# Patient Record
Sex: FEMALE | Race: OTHER | NOT HISPANIC OR LATINO
[De-identification: names, ages, dates, MRNs, and addresses within clinical notes are randomized per-mention and may not be internally consistent; named-entity substitution may affect disease eponyms.]

---

## 2017-08-04 PROBLEM — Z00.00 ENCOUNTER FOR PREVENTIVE HEALTH EXAMINATION: Status: ACTIVE | Noted: 2017-08-04

## 2017-10-03 ENCOUNTER — TRANSCRIPTION ENCOUNTER (OUTPATIENT)
Age: 72
End: 2017-10-03

## 2017-10-04 ENCOUNTER — APPOINTMENT (OUTPATIENT)
Dept: ORTHOPEDIC SURGERY | Facility: CLINIC | Age: 72
End: 2017-10-04
Payer: MEDICARE

## 2017-10-04 VITALS
HEART RATE: 93 BPM | BODY MASS INDEX: 32.37 KG/M2 | HEIGHT: 63.5 IN | DIASTOLIC BLOOD PRESSURE: 83 MMHG | WEIGHT: 185 LBS | SYSTOLIC BLOOD PRESSURE: 144 MMHG

## 2017-10-04 DIAGNOSIS — Z87.39 PERSONAL HISTORY OF OTHER DISEASES OF THE MUSCULOSKELETAL SYSTEM AND CONNECTIVE TISSUE: ICD-10-CM

## 2017-10-04 DIAGNOSIS — Z80.9 FAMILY HISTORY OF MALIGNANT NEOPLASM, UNSPECIFIED: ICD-10-CM

## 2017-10-04 DIAGNOSIS — Z78.9 OTHER SPECIFIED HEALTH STATUS: ICD-10-CM

## 2017-10-04 DIAGNOSIS — M17.11 UNILATERAL PRIMARY OSTEOARTHRITIS, RIGHT KNEE: ICD-10-CM

## 2017-10-04 PROCEDURE — 99204 OFFICE O/P NEW MOD 45 MIN: CPT

## 2017-10-04 PROCEDURE — 73562 X-RAY EXAM OF KNEE 3: CPT | Mod: RT

## 2017-10-04 PROCEDURE — 73502 X-RAY EXAM HIP UNI 2-3 VIEWS: CPT | Mod: RT

## 2017-10-04 RX ORDER — ACETAMINOPHEN 325 MG/1
TABLET, FILM COATED ORAL
Refills: 0 | Status: ACTIVE | COMMUNITY

## 2017-10-18 ENCOUNTER — OUTPATIENT (OUTPATIENT)
Dept: OUTPATIENT SERVICES | Facility: HOSPITAL | Age: 72
LOS: 1 days | End: 2017-10-18
Payer: MEDICARE

## 2017-10-18 VITALS
DIASTOLIC BLOOD PRESSURE: 83 MMHG | TEMPERATURE: 97 F | SYSTOLIC BLOOD PRESSURE: 143 MMHG | HEIGHT: 63 IN | HEART RATE: 59 BPM | RESPIRATION RATE: 16 BRPM | WEIGHT: 187.39 LBS

## 2017-10-18 DIAGNOSIS — M21.619 BUNION OF UNSPECIFIED FOOT: Chronic | ICD-10-CM

## 2017-10-18 DIAGNOSIS — Z01.818 ENCOUNTER FOR OTHER PREPROCEDURAL EXAMINATION: ICD-10-CM

## 2017-10-18 DIAGNOSIS — M25.9 JOINT DISORDER, UNSPECIFIED: Chronic | ICD-10-CM

## 2017-10-18 DIAGNOSIS — M25.569 PAIN IN UNSPECIFIED KNEE: ICD-10-CM

## 2017-10-18 DIAGNOSIS — M85.619 OTHER CYST OF BONE, UNSPECIFIED SHOULDER: Chronic | ICD-10-CM

## 2017-10-18 LAB
ALBUMIN SERPL ELPH-MCNC: 4.2 G/DL — SIGNIFICANT CHANGE UP (ref 3.3–5.2)
ALP SERPL-CCNC: 83 U/L — SIGNIFICANT CHANGE UP (ref 40–120)
ALT FLD-CCNC: 15 U/L — SIGNIFICANT CHANGE UP
ANION GAP SERPL CALC-SCNC: 12 MMOL/L — SIGNIFICANT CHANGE UP (ref 5–17)
APTT BLD: 33.2 SEC — SIGNIFICANT CHANGE UP (ref 27.5–37.4)
AST SERPL-CCNC: 16 U/L — SIGNIFICANT CHANGE UP
BASOPHILS # BLD AUTO: 0 K/UL — SIGNIFICANT CHANGE UP (ref 0–0.2)
BASOPHILS NFR BLD AUTO: 0.4 % — SIGNIFICANT CHANGE UP (ref 0–2)
BILIRUB SERPL-MCNC: 0.5 MG/DL — SIGNIFICANT CHANGE UP (ref 0.4–2)
BLD GP AB SCN SERPL QL: SIGNIFICANT CHANGE UP
BUN SERPL-MCNC: 14 MG/DL — SIGNIFICANT CHANGE UP (ref 8–20)
CALCIUM SERPL-MCNC: 9.7 MG/DL — SIGNIFICANT CHANGE UP (ref 8.6–10.2)
CHLORIDE SERPL-SCNC: 102 MMOL/L — SIGNIFICANT CHANGE UP (ref 98–107)
CO2 SERPL-SCNC: 29 MMOL/L — SIGNIFICANT CHANGE UP (ref 22–29)
CREAT SERPL-MCNC: 0.5 MG/DL — SIGNIFICANT CHANGE UP (ref 0.5–1.3)
EOSINOPHIL # BLD AUTO: 0.3 K/UL — SIGNIFICANT CHANGE UP (ref 0–0.5)
EOSINOPHIL NFR BLD AUTO: 4.9 % — SIGNIFICANT CHANGE UP (ref 0–6)
GLUCOSE SERPL-MCNC: 85 MG/DL — SIGNIFICANT CHANGE UP (ref 70–115)
HCT VFR BLD CALC: 44.7 % — SIGNIFICANT CHANGE UP (ref 37–47)
HGB BLD-MCNC: 14 G/DL — SIGNIFICANT CHANGE UP (ref 12–16)
INR BLD: 0.98 RATIO — SIGNIFICANT CHANGE UP (ref 0.88–1.16)
LYMPHOCYTES # BLD AUTO: 1.6 K/UL — SIGNIFICANT CHANGE UP (ref 1–4.8)
LYMPHOCYTES # BLD AUTO: 23.7 % — SIGNIFICANT CHANGE UP (ref 20–55)
MCHC RBC-ENTMCNC: 28.6 PG — SIGNIFICANT CHANGE UP (ref 27–31)
MCHC RBC-ENTMCNC: 31.3 G/DL — LOW (ref 32–36)
MCV RBC AUTO: 91.2 FL — SIGNIFICANT CHANGE UP (ref 81–99)
MONOCYTES # BLD AUTO: 0.7 K/UL — SIGNIFICANT CHANGE UP (ref 0–0.8)
MONOCYTES NFR BLD AUTO: 9.9 % — SIGNIFICANT CHANGE UP (ref 3–10)
MRSA PCR RESULT.: SIGNIFICANT CHANGE UP
NEUTROPHILS # BLD AUTO: 4.2 K/UL — SIGNIFICANT CHANGE UP (ref 1.8–8)
NEUTROPHILS NFR BLD AUTO: 60.8 % — SIGNIFICANT CHANGE UP (ref 37–73)
PLATELET # BLD AUTO: 230 K/UL — SIGNIFICANT CHANGE UP (ref 150–400)
POTASSIUM SERPL-MCNC: 4.4 MMOL/L — SIGNIFICANT CHANGE UP (ref 3.5–5.3)
POTASSIUM SERPL-SCNC: 4.4 MMOL/L — SIGNIFICANT CHANGE UP (ref 3.5–5.3)
PROT SERPL-MCNC: 7.1 G/DL — SIGNIFICANT CHANGE UP (ref 6.6–8.7)
PROTHROM AB SERPL-ACNC: 10.8 SEC — SIGNIFICANT CHANGE UP (ref 9.8–12.7)
RBC # BLD: 4.9 M/UL — SIGNIFICANT CHANGE UP (ref 4.4–5.2)
RBC # FLD: 14.9 % — SIGNIFICANT CHANGE UP (ref 11–15.6)
S AUREUS DNA NOSE QL NAA+PROBE: SIGNIFICANT CHANGE UP
SODIUM SERPL-SCNC: 143 MMOL/L — SIGNIFICANT CHANGE UP (ref 135–145)
TYPE + AB SCN PNL BLD: SIGNIFICANT CHANGE UP
WBC # BLD: 7 K/UL — SIGNIFICANT CHANGE UP (ref 4.8–10.8)
WBC # FLD AUTO: 7 K/UL — SIGNIFICANT CHANGE UP (ref 4.8–10.8)

## 2017-10-18 PROCEDURE — 85610 PROTHROMBIN TIME: CPT

## 2017-10-18 PROCEDURE — 93005 ELECTROCARDIOGRAM TRACING: CPT

## 2017-10-18 PROCEDURE — 93010 ELECTROCARDIOGRAM REPORT: CPT

## 2017-10-18 PROCEDURE — 85027 COMPLETE CBC AUTOMATED: CPT

## 2017-10-18 PROCEDURE — G0463: CPT

## 2017-10-18 PROCEDURE — 86850 RBC ANTIBODY SCREEN: CPT

## 2017-10-18 PROCEDURE — 87640 STAPH A DNA AMP PROBE: CPT

## 2017-10-18 PROCEDURE — 85730 THROMBOPLASTIN TIME PARTIAL: CPT

## 2017-10-18 PROCEDURE — 36415 COLL VENOUS BLD VENIPUNCTURE: CPT

## 2017-10-18 PROCEDURE — 80053 COMPREHEN METABOLIC PANEL: CPT

## 2017-10-18 PROCEDURE — 87641 MR-STAPH DNA AMP PROBE: CPT

## 2017-10-18 PROCEDURE — 86900 BLOOD TYPING SEROLOGIC ABO: CPT

## 2017-10-18 PROCEDURE — 86901 BLOOD TYPING SEROLOGIC RH(D): CPT

## 2017-10-18 RX ORDER — SODIUM CHLORIDE 9 MG/ML
3 INJECTION INTRAMUSCULAR; INTRAVENOUS; SUBCUTANEOUS EVERY 8 HOURS
Qty: 0 | Refills: 0 | Status: DISCONTINUED | OUTPATIENT
Start: 2017-11-09 | End: 2017-11-12

## 2017-10-18 NOTE — H&P PST ADULT - PSH
Bunion  great toe bilaterally , left foot small toe  Cyst of clavicle  benign  Disorder of wrist joint  left wrist tendon  2017

## 2017-10-18 NOTE — H&P PST ADULT - HISTORY OF PRESENT ILLNESS
72 year old female presents with a several year history of bilateral knee pain, right is worse than left. 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 pt has pain with stairs and walking. Pt fell in July walking down stairs . Ambulates without assistance, takes tylenol for pain.

## 2017-10-18 NOTE — H&P PST ADULT - ASSESSMENT
Pleasant 72 year old female with history of knee pain and arthritis presents for right knee replacement.

## 2017-10-18 NOTE — H&P PST ADULT - MUSCULOSKELETAL COMMENTS
back pain , l2 compression fracture physical therapy  weekly (fell July 2017). bilateral knee pain right knee cracking with extension.

## 2017-10-18 NOTE — H&P PST ADULT - FAMILY HISTORY
Father  Still living? No  Family history of cancer, Age at diagnosis: Age Unknown     Mother  Still living? Yes, Estimated age: 81-90  Family history of Alzheimer's disease, Age at diagnosis: Age Unknown

## 2017-11-07 ENCOUNTER — CHART COPY (OUTPATIENT)
Age: 72
End: 2017-11-07

## 2017-11-07 RX ORDER — CELECOXIB 200 MG/1
400 CAPSULE ORAL ONCE
Qty: 0 | Refills: 0 | Status: COMPLETED | OUTPATIENT
Start: 2017-11-09 | End: 2017-11-09

## 2017-11-07 RX ORDER — GABAPENTIN 400 MG/1
600 CAPSULE ORAL ONCE
Qty: 0 | Refills: 0 | Status: COMPLETED | OUTPATIENT
Start: 2017-11-09 | End: 2017-11-09

## 2017-11-07 RX ORDER — OXYCODONE HYDROCHLORIDE 5 MG/1
10 TABLET ORAL ONCE
Qty: 0 | Refills: 0 | Status: DISCONTINUED | OUTPATIENT
Start: 2017-11-09 | End: 2017-11-09

## 2017-11-08 ENCOUNTER — FORM ENCOUNTER (OUTPATIENT)
Age: 72
End: 2017-11-08

## 2017-11-09 ENCOUNTER — APPOINTMENT (OUTPATIENT)
Dept: ORTHOPEDIC SURGERY | Facility: HOSPITAL | Age: 72
End: 2017-11-09

## 2017-11-09 ENCOUNTER — RESULT REVIEW (OUTPATIENT)
Age: 72
End: 2017-11-09

## 2017-11-09 ENCOUNTER — INPATIENT (INPATIENT)
Facility: HOSPITAL | Age: 72
LOS: 2 days | Discharge: HOME | DRG: 470 | End: 2017-11-12
Attending: ORTHOPAEDIC SURGERY | Admitting: ORTHOPAEDIC SURGERY
Payer: MEDICARE

## 2017-11-09 ENCOUNTER — TRANSCRIPTION ENCOUNTER (OUTPATIENT)
Age: 72
End: 2017-11-09

## 2017-11-09 VITALS
HEART RATE: 75 BPM | DIASTOLIC BLOOD PRESSURE: 71 MMHG | WEIGHT: 187.39 LBS | RESPIRATION RATE: 16 BRPM | TEMPERATURE: 98 F | HEIGHT: 63 IN | SYSTOLIC BLOOD PRESSURE: 132 MMHG | OXYGEN SATURATION: 100 %

## 2017-11-09 DIAGNOSIS — M85.619 OTHER CYST OF BONE, UNSPECIFIED SHOULDER: Chronic | ICD-10-CM

## 2017-11-09 DIAGNOSIS — Z96.651 PRESENCE OF RIGHT ARTIFICIAL KNEE JOINT: ICD-10-CM

## 2017-11-09 DIAGNOSIS — Z29.9 ENCOUNTER FOR PROPHYLACTIC MEASURES, UNSPECIFIED: ICD-10-CM

## 2017-11-09 DIAGNOSIS — S32.020A WEDGE COMPRESSION FRACTURE OF SECOND LUMBAR VERTEBRA, INITIAL ENCOUNTER FOR CLOSED FRACTURE: ICD-10-CM

## 2017-11-09 DIAGNOSIS — M25.9 JOINT DISORDER, UNSPECIFIED: Chronic | ICD-10-CM

## 2017-11-09 DIAGNOSIS — M17.11 UNILATERAL PRIMARY OSTEOARTHRITIS, RIGHT KNEE: ICD-10-CM

## 2017-11-09 DIAGNOSIS — M21.619 BUNION OF UNSPECIFIED FOOT: Chronic | ICD-10-CM

## 2017-11-09 LAB — BLD GP AB SCN SERPL QL: SIGNIFICANT CHANGE UP

## 2017-11-09 PROCEDURE — 88305 TISSUE EXAM BY PATHOLOGIST: CPT | Mod: 26

## 2017-11-09 PROCEDURE — 99232 SBSQ HOSP IP/OBS MODERATE 35: CPT

## 2017-11-09 PROCEDURE — 99222 1ST HOSP IP/OBS MODERATE 55: CPT

## 2017-11-09 PROCEDURE — 27447 TOTAL KNEE ARTHROPLASTY: CPT | Mod: RT

## 2017-11-09 PROCEDURE — 27447 TOTAL KNEE ARTHROPLASTY: CPT | Mod: AS,RT

## 2017-11-09 PROCEDURE — 88311 DECALCIFY TISSUE: CPT | Mod: 26

## 2017-11-09 PROCEDURE — 20985 CPTR-ASST DIR MS PX: CPT | Mod: AS

## 2017-11-09 PROCEDURE — 73560 X-RAY EXAM OF KNEE 1 OR 2: CPT | Mod: 26,RT

## 2017-11-09 PROCEDURE — 20985 CPTR-ASST DIR MS PX: CPT

## 2017-11-09 RX ORDER — DOCUSATE SODIUM 100 MG
100 CAPSULE ORAL THREE TIMES A DAY
Qty: 0 | Refills: 0 | Status: DISCONTINUED | OUTPATIENT
Start: 2017-11-09 | End: 2017-11-12

## 2017-11-09 RX ORDER — KETOROLAC TROMETHAMINE 30 MG/ML
15 SYRINGE (ML) INJECTION EVERY 6 HOURS
Qty: 0 | Refills: 0 | Status: DISCONTINUED | OUTPATIENT
Start: 2017-11-09 | End: 2017-11-11

## 2017-11-09 RX ORDER — ASPIRIN/CALCIUM CARB/MAGNESIUM 324 MG
325 TABLET ORAL
Qty: 0 | Refills: 0 | Status: DISCONTINUED | OUTPATIENT
Start: 2017-11-10 | End: 2017-11-12

## 2017-11-09 RX ORDER — HYDROMORPHONE HYDROCHLORIDE 2 MG/ML
0.5 INJECTION INTRAMUSCULAR; INTRAVENOUS; SUBCUTANEOUS
Qty: 0 | Refills: 0 | Status: DISCONTINUED | OUTPATIENT
Start: 2017-11-09 | End: 2017-11-09

## 2017-11-09 RX ORDER — VANCOMYCIN HCL 1 G
1250 VIAL (EA) INTRAVENOUS
Qty: 0 | Refills: 0 | Status: COMPLETED | OUTPATIENT
Start: 2017-11-09 | End: 2017-11-09

## 2017-11-09 RX ORDER — HYDROMORPHONE HYDROCHLORIDE 2 MG/ML
2 INJECTION INTRAMUSCULAR; INTRAVENOUS; SUBCUTANEOUS
Qty: 0 | Refills: 0 | Status: DISCONTINUED | OUTPATIENT
Start: 2017-11-09 | End: 2017-11-12

## 2017-11-09 RX ORDER — SODIUM CHLORIDE 9 MG/ML
1000 INJECTION, SOLUTION INTRAVENOUS
Qty: 0 | Refills: 0 | Status: DISCONTINUED | OUTPATIENT
Start: 2017-11-09 | End: 2017-11-10

## 2017-11-09 RX ORDER — TRANEXAMIC ACID 100 MG/ML
850 INJECTION, SOLUTION INTRAVENOUS ONCE
Qty: 0 | Refills: 0 | Status: COMPLETED | OUTPATIENT
Start: 2017-11-09 | End: 2017-11-09

## 2017-11-09 RX ORDER — VANCOMYCIN HCL 1 G
1250 VIAL (EA) INTRAVENOUS ONCE
Qty: 0 | Refills: 0 | Status: COMPLETED | OUTPATIENT
Start: 2017-11-09 | End: 2017-11-09

## 2017-11-09 RX ORDER — MORPHINE SULFATE 50 MG/1
4 CAPSULE, EXTENDED RELEASE ORAL
Qty: 0 | Refills: 0 | Status: DISCONTINUED | OUTPATIENT
Start: 2017-11-09 | End: 2017-11-09

## 2017-11-09 RX ORDER — ACETAMINOPHEN 500 MG
2 TABLET ORAL
Qty: 0 | Refills: 0 | COMMUNITY

## 2017-11-09 RX ORDER — POLYETHYLENE GLYCOL 3350 17 G/17G
17 POWDER, FOR SOLUTION ORAL DAILY
Qty: 0 | Refills: 0 | Status: DISCONTINUED | OUTPATIENT
Start: 2017-11-09 | End: 2017-11-12

## 2017-11-09 RX ORDER — CELECOXIB 200 MG/1
200 CAPSULE ORAL
Qty: 0 | Refills: 0 | Status: DISCONTINUED | OUTPATIENT
Start: 2017-11-10 | End: 2017-11-12

## 2017-11-09 RX ORDER — OXYCODONE HYDROCHLORIDE 5 MG/1
5 TABLET ORAL EVERY 4 HOURS
Qty: 0 | Refills: 0 | Status: DISCONTINUED | OUTPATIENT
Start: 2017-11-09 | End: 2017-11-12

## 2017-11-09 RX ORDER — OXYCODONE HYDROCHLORIDE 5 MG/1
10 TABLET ORAL EVERY 4 HOURS
Qty: 0 | Refills: 0 | Status: DISCONTINUED | OUTPATIENT
Start: 2017-11-09 | End: 2017-11-12

## 2017-11-09 RX ORDER — ACETAMINOPHEN 500 MG
650 TABLET ORAL EVERY 6 HOURS
Qty: 0 | Refills: 0 | Status: DISCONTINUED | OUTPATIENT
Start: 2017-11-09 | End: 2017-11-12

## 2017-11-09 RX ORDER — ACETAMINOPHEN 500 MG
650 TABLET ORAL EVERY 6 HOURS
Qty: 0 | Refills: 0 | Status: COMPLETED | OUTPATIENT
Start: 2017-11-09 | End: 2017-11-12

## 2017-11-09 RX ORDER — ONDANSETRON 8 MG/1
4 TABLET, FILM COATED ORAL EVERY 6 HOURS
Qty: 0 | Refills: 0 | Status: DISCONTINUED | OUTPATIENT
Start: 2017-11-09 | End: 2017-11-12

## 2017-11-09 RX ORDER — OXYCODONE HYDROCHLORIDE 5 MG/1
10 TABLET ORAL EVERY 12 HOURS
Qty: 0 | Refills: 0 | Status: DISCONTINUED | OUTPATIENT
Start: 2017-11-09 | End: 2017-11-12

## 2017-11-09 RX ORDER — HYDROMORPHONE HYDROCHLORIDE 2 MG/ML
0.5 INJECTION INTRAMUSCULAR; INTRAVENOUS; SUBCUTANEOUS EVERY 4 HOURS
Qty: 0 | Refills: 0 | Status: DISCONTINUED | OUTPATIENT
Start: 2017-11-09 | End: 2017-11-12

## 2017-11-09 RX ORDER — CEFAZOLIN SODIUM 1 G
2000 VIAL (EA) INJECTION
Qty: 0 | Refills: 0 | Status: COMPLETED | OUTPATIENT
Start: 2017-11-09 | End: 2017-11-10

## 2017-11-09 RX ORDER — SENNA PLUS 8.6 MG/1
2 TABLET ORAL AT BEDTIME
Qty: 0 | Refills: 0 | Status: DISCONTINUED | OUTPATIENT
Start: 2017-11-09 | End: 2017-11-12

## 2017-11-09 RX ORDER — ACETAMINOPHEN 500 MG
1000 TABLET ORAL ONCE
Qty: 0 | Refills: 0 | Status: COMPLETED | OUTPATIENT
Start: 2017-11-09 | End: 2017-11-09

## 2017-11-09 RX ORDER — MAGNESIUM HYDROXIDE 400 MG/1
30 TABLET, CHEWABLE ORAL DAILY
Qty: 0 | Refills: 0 | Status: DISCONTINUED | OUTPATIENT
Start: 2017-11-09 | End: 2017-11-12

## 2017-11-09 RX ORDER — ONDANSETRON 8 MG/1
4 TABLET, FILM COATED ORAL ONCE
Qty: 0 | Refills: 0 | Status: DISCONTINUED | OUTPATIENT
Start: 2017-11-09 | End: 2017-11-09

## 2017-11-09 RX ORDER — CEFAZOLIN SODIUM 1 G
2000 VIAL (EA) INJECTION ONCE
Qty: 0 | Refills: 0 | Status: COMPLETED | OUTPATIENT
Start: 2017-11-09 | End: 2017-11-09

## 2017-11-09 RX ADMIN — Medication 100 MILLIGRAM(S): at 11:00

## 2017-11-09 RX ADMIN — OXYCODONE HYDROCHLORIDE 10 MILLIGRAM(S): 5 TABLET ORAL at 10:43

## 2017-11-09 RX ADMIN — Medication 166.67 MILLIGRAM(S): at 11:00

## 2017-11-09 RX ADMIN — TRANEXAMIC ACID 217 MILLIGRAM(S): 100 INJECTION, SOLUTION INTRAVENOUS at 11:00

## 2017-11-09 RX ADMIN — Medication 166.67 MILLIGRAM(S): at 23:38

## 2017-11-09 RX ADMIN — CELECOXIB 400 MILLIGRAM(S): 200 CAPSULE ORAL at 10:42

## 2017-11-09 RX ADMIN — HYDROMORPHONE HYDROCHLORIDE 2 MILLIGRAM(S): 2 INJECTION INTRAMUSCULAR; INTRAVENOUS; SUBCUTANEOUS at 23:51

## 2017-11-09 RX ADMIN — Medication 100 MILLIGRAM(S): at 18:09

## 2017-11-09 RX ADMIN — ONDANSETRON 4 MILLIGRAM(S): 8 TABLET, FILM COATED ORAL at 19:55

## 2017-11-09 RX ADMIN — SODIUM CHLORIDE 3 MILLILITER(S): 9 INJECTION INTRAMUSCULAR; INTRAVENOUS; SUBCUTANEOUS at 21:54

## 2017-11-09 RX ADMIN — Medication 650 MILLIGRAM(S): at 18:10

## 2017-11-09 RX ADMIN — Medication 650 MILLIGRAM(S): at 19:30

## 2017-11-09 RX ADMIN — SODIUM CHLORIDE 3 MILLILITER(S): 9 INJECTION INTRAMUSCULAR; INTRAVENOUS; SUBCUTANEOUS at 13:54

## 2017-11-09 RX ADMIN — Medication 400 MILLIGRAM(S): at 13:53

## 2017-11-09 RX ADMIN — GABAPENTIN 600 MILLIGRAM(S): 400 CAPSULE ORAL at 10:42

## 2017-11-09 RX ADMIN — Medication 100 MILLIGRAM(S): at 23:38

## 2017-11-09 RX ADMIN — CELECOXIB 400 MILLIGRAM(S): 200 CAPSULE ORAL at 10:43

## 2017-11-09 RX ADMIN — Medication 650 MILLIGRAM(S): at 23:38

## 2017-11-09 RX ADMIN — OXYCODONE HYDROCHLORIDE 10 MILLIGRAM(S): 5 TABLET ORAL at 10:42

## 2017-11-09 NOTE — PHYSICAL THERAPY INITIAL EVALUATION ADULT - RANGE OF MOTION EXAMINATION, REHAB EVAL
bilateral upper extremity ROM was WFL (within functional limits)/Left LE ROM was WFL (within functional limits)/right knee lacking approx 5-10 degrees extension (in sitting) to approx 60-65 degrees flexion (in sitting)

## 2017-11-09 NOTE — DISCHARGE NOTE ADULT - CARE PLAN
Principal Discharge DX:	Primary osteoarthritis of right knee  Goal:	Decrease Pain, Improve Ambulation and Activities of Daily living  Instructions for follow-up, activity and diet:	The patient will be seen in the office between 2-3 weeks for wound check. Tape will be removed at that time. Patient may shower after post-op day #5. The dressing is to be removed on day #10 (11/20/2017).  The patient will contact the office if the wound becomes red, has increasing pain, develops bleeding or discharge, an injury occurs, or has other concerns. The patient will continue PT consistent with total knee replacement. The patient will continue ASPIRIN 325mg twice daily for 6 weeks for DVTP. The patient will take OXYCODONE and TYLENOL for pain control and titrate according to prescription and patient needs. The patient is FULL weight bearing. Elevation of the lower leg is recommended to reduce swelling. Principal Discharge DX:	Primary osteoarthritis of right knee  Goal:	Decrease Pain, Improve Ambulation and Activities of Daily living  Instructions for follow-up, activity and diet:	The patient will be seen in the office between 2-3 weeks for wound check. Tape will be removed at that time. Patient may shower after post-op day #5, 11/14. The dressing is to be removed on day #10 (11/20/2017).  The patient will contact the office if the wound becomes red, has increasing pain, develops bleeding or discharge, an injury occurs, or has other concerns. The patient will continue PT consistent with total knee replacement. The patient will continue ASPIRIN 325mg twice daily for 6 weeks for DVTP. The patient will take OXYCODONE and TYLENOL for pain control and titrate according to prescription and patient needs. The patient is FULL weight bearing. Elevation of the lower leg is recommended to reduce swelling.

## 2017-11-09 NOTE — PHYSICAL THERAPY INITIAL EVALUATION ADULT - CRITERIA FOR SKILLED THERAPEUTIC INTERVENTIONS
anticipated equipment needs at discharge/anticipated discharge recommendation/Home with home PT, RW/impairments found

## 2017-11-09 NOTE — CONSULT NOTE ADULT - ASSESSMENT
72 year old female presents with a several year history of bilateral knee pain, right is worse than left. 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 Pain increased  with stairs and walking. Pt fell in July walking down stairs . Ambulates without assistance, takes Tylenol for pain.  Now s/p R TKA , POD # 0

## 2017-11-09 NOTE — DISCHARGE NOTE ADULT - MEDICATION SUMMARY - MEDICATIONS TO TAKE
I will START or STAY ON the medications listed below when I get home from the hospital:    Tylenol 325 mg oral tablet  -- 2 tab(s) by mouth every 4 hours, As Needed  -- Indication: For home med/pain    oxyCODONE 10 mg oral tablet  -- 0.5-1 tab(s) by mouth every 4 to 6 hours, As Needed MDD:6  -- Indication: For Pain    aspirin 325 mg oral delayed release tablet  -- 1 tab(s) by mouth 2 times a day  -- Indication: For DVTP    Senna S 50 mg-8.6 mg oral tablet  -- 2 tab(s) by mouth once a day (at bedtime), As Needed   -- Medication should be taken with plenty of water.    -- Indication: For Constipation

## 2017-11-09 NOTE — PHYSICAL THERAPY INITIAL EVALUATION ADULT - GAIT DEVIATIONS NOTED, PT EVAL
decreased monse/decreased weight-shifting ability/increased time in double stance/decreased step length/decreased stride length

## 2017-11-09 NOTE — DISCHARGE NOTE ADULT - PLAN OF CARE
Decrease Pain, Improve Ambulation and Activities of Daily living The patient will be seen in the office between 2-3 weeks for wound check. Tape will be removed at that time. Patient may shower after post-op day #5. The dressing is to be removed on day #10 (11/20/2017).  The patient will contact the office if the wound becomes red, has increasing pain, develops bleeding or discharge, an injury occurs, or has other concerns. The patient will continue PT consistent with total knee replacement. The patient will continue ASPIRIN 325mg twice daily for 6 weeks for DVTP. The patient will take OXYCODONE and TYLENOL for pain control and titrate according to prescription and patient needs. The patient is FULL weight bearing. Elevation of the lower leg is recommended to reduce swelling. The patient will be seen in the office between 2-3 weeks for wound check. Tape will be removed at that time. Patient may shower after post-op day #5, 11/14. The dressing is to be removed on day #10 (11/20/2017).  The patient will contact the office if the wound becomes red, has increasing pain, develops bleeding or discharge, an injury occurs, or has other concerns. The patient will continue PT consistent with total knee replacement. The patient will continue ASPIRIN 325mg twice daily for 6 weeks for DVTP. The patient will take OXYCODONE and TYLENOL for pain control and titrate according to prescription and patient needs. The patient is FULL weight bearing. Elevation of the lower leg is recommended to reduce swelling.

## 2017-11-09 NOTE — DISCHARGE NOTE ADULT - HOSPITAL COURSE
The patient underwent a RIGHT TOTAL KNEE REPLACEMENT on 11/9/2017. The patient received antibiotics consistent with SCIP guidelines. The patient underwent the procedure and had no intra-operative complications. Post-operatively, the patient was seen by medicine and PT. The patient received ASPIRIN for DVTP. The patient received pain medications per orthopedic pain management protocol and the pain was appropriately controlled. The patient did not have any post-operative medical complications. The patient was discharged in stable condition.

## 2017-11-09 NOTE — CONSULT NOTE ADULT - SUBJECTIVE AND OBJECTIVE BOX
PMD : Sarmad   Cardio :     Patient is a 72y old  Female seen and examined on PACU , S/P R TKA , POD # 0    HPI: 72 year old female presents with a several year history of bilateral knee pain, right is worse than left. 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 Pain increased  with stairs and walking. Pt fell in July walking down stairs . Ambulates without assistance, takes Tylenol for pain.  Now s/p R TKA , POD # 0    PAST MEDICAL & SURGICAL HISTORY:  Knee pain  Compression fracture of L2  Arthritis  Cyst of clavicle: benign  Disorder of wrist joint: left wrist tendon  2017  Bunion: great toe bilaterally , left foot small toe       Past Surgical History:  Bunion  great toe bilaterally , left foot small toe  Cyst of clavicle  benign  Disorder of wrist joint  left wrist tendon  2017.     Family History:  Father  Still living? No  Family history of cancer, Age at diagnosis: Age Unknown     Mother  Still living? Yes, Estimated age: 81-90  Family history of Alzheimer's disease, Age at diagnosis: Age Unknown.     Social History:  · Marital Status	  · Occupation	teaches dance  · Lives With	children; spouse; daughter     Substance Use History:  · Substance Use	caffeine  denies drug use  · Caffeine Type	coffee  · Caffeine Amount/Frequency	1-2 cups/cans per day     Alcohol Use History:  · Have you ever consumed alcohol	yes...  · Alcohol Type	wine  · Alcohol Frequency	monthly or less  · Alcohol Amount	1-2 drinks  · Problems Related to Alcohol Use	no  · 1. Have you felt you ought to CUT down on your drinking?	no  · 2. Have people ANNOYED you by criticizing your drinking?	no  · 3. Have you ever felt bad or GUILTY about your drinking?	no  · 4. Have you ever needed an "EYE OPENER", a drink first thing in the morning to steady your nerves or get rid of a hangover?	no     Tobacco Usage:  · Tobacco Usage: Never smoker      FAMILY HISTORY:  Family history of Alzheimer's disease (Mother)  Family history of cancer (Father)      Allergies    No Known Allergies    Intolerances        HOME MEDICATIONS :     REVIEW OF SYSTEMS:    CONSTITUTIONAL: No fever, weight loss, or fatigue  EYES: No eye pain, visual disturbances, or discharge  NECK: No pain or stiffness  RESPIRATORY: No cough, wheezing, chills or hemoptysis; No shortness of breath  CARDIOVASCULAR: No chest pain, palpitations, dizziness, or leg swelling  GASTROINTESTINAL: No abdominal or epigastric pain. No nausea, vomiting, or hematemesis; No diarrhea or constipation. No melena or hematochezia.  GENITOURINARY: No dysuria, frequency, hematuria, or incontinence  NEUROLOGICAL: No headaches, memory loss, loss of strength, numbness, or tremors  SKIN: No itching, burning, rashes, or lesions   LYMPH NODES: No enlarged glands  ENDOCRINE: No heat or cold intolerance; No hair loss  MUSCULOSKELETAL:   PSYCHIATRIC: No depression, anxiety, mood swings, or difficulty sleeping  HEME/LYMPH: No easy bruising, or bleeding gums  ALLERGY AND IMMUNOLOGIC: No hives or eczema    MEDICATIONS  (STANDING):  sodium chloride 0.9% lock flush 3 milliLiter(s) IV Push every 8 hours    MEDICATIONS  (PRN):  HYDROmorphone  Injectable 0.5 milliGRAM(s) IV Push every 10 minutes PRN Severe Pain (7 - 10)  morphine  - Injectable 4 milliGRAM(s) IV Push every 15 minutes PRN Moderate Pain (4 - 6)  ondansetron Injectable 4 milliGRAM(s) IV Push once PRN Nausea and/or Vomiting  promethazine IVPB 6.25 milliGRAM(s) IV Intermittent once PRN Nausea and/or Vomiting      Vital Signs Last 24 Hrs  T(C): 36.2 (09 Nov 2017 13:17), Max: 36.7 (09 Nov 2017 09:25)  T(F): 97.2 (09 Nov 2017 13:17), Max: 98.1 (09 Nov 2017 09:25)  HR: 81 (09 Nov 2017 13:32) (75 - 83)  BP: 107/64 (09 Nov 2017 13:32) (98/57 - 132/71)  BP(mean): --  RR: 14 (09 Nov 2017 13:32) (13 - 16)  SpO2: 98% (09 Nov 2017 13:32) (98% - 100%)    PHYSICAL EXAM:    GENERAL: NAD, well-groomed, well-developed  HEAD:  Atraumatic, Normocephalic  EYES: EOMI, PERRLA, conjunctiva and sclera clear  NECK: Supple, No JVD, Normal thyroid  NERVOUS SYSTEM:  Alert & Oriented X3, Good concentration; Motor Strength 5/5 B/L upper and lower extremities; DTRs 2+ intact and symmetric  CHEST/LUNG: CTA  b/l,  no rales, rhonchi, wheezing, or rubs  HEART: Regular rate and rhythm; No murmurs, rubs, or gallops  ABDOMEN: Soft, Nontender, Nondistended; Bowel sounds present  EXTREMITIES:  2+ Peripheral Pulses, No clubbing, cyanosis, or edema ,   LYMPH: No lymphadenopathy noted  SKIN: No rashes or lesions    LABS:                  RADIOLOGY & ADDITIONAL STUDIES: PMD : Sarmad     Patient is a 72y old  Female seen and examined on PACU , S/P R TKA , POD # 0    HPI: 72 year old female with PMH recent L2 compression fracture and OA presents with a several year history of bilateral knee pain, right is worse than left. 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 Pain increased  with stairs and walking. Pt fell in July walking down stairs . Ambulates without assistance, takes Tylenol for pain.  Now s/p R TKA , POD # 0    PAST MEDICAL & SURGICAL HISTORY:  Knee pain  Compression fracture of L2  Arthritis  Cyst of clavicle: benign  Disorder of wrist joint: left wrist tendon  2017  Bunion: great toe bilaterally , left foot small toe       Past Surgical History:  Bunion  great toe bilaterally , left foot small toe  Cyst of clavicle  benign  Disorder of wrist joint  left wrist tendon  2017.     Family History:  Father  Still living? No  Family history of cancer, Age at diagnosis: Age Unknown     Mother  Still living? Yes, Estimated age: 81-90  Family history of Alzheimer's disease, Age at diagnosis: Age Unknown.     Social History:  · Marital Status	  · Occupation	teaches dance  · Lives With	children; spouse; daughter     Substance Use History:  · Substance Use	caffeine  denies drug use  · Caffeine Type	coffee  · Caffeine Amount/Frequency	1-2 cups/cans per day     Alcohol Use History:  · Have you ever consumed alcohol	yes...  · Alcohol Type	wine  · Alcohol Frequency	monthly or less  · Alcohol Amount	1-2 drinks  · Problems Related to Alcohol Use	no  · 1. Have you felt you ought to CUT down on your drinking?	no  · 2. Have people ANNOYED you by criticizing your drinking?	no  · 3. Have you ever felt bad or GUILTY about your drinking?	no  · 4. Have you ever needed an "EYE OPENER", a drink first thing in the morning to steady your nerves or get rid of a hangover?	no     Tobacco Usage:  · Tobacco Usage: Never smoker      FAMILY HISTORY:  Family history of Alzheimer's disease (Mother)  Family history of cancer (Father)      Allergies    No Known Allergies    Intolerances        HOME MEDICATIONS :   Tylenol prn  Advil prn    REVIEW OF SYSTEMS:    CONSTITUTIONAL: No fever, weight loss, or fatigue  EYES: No eye pain, visual disturbances, or discharge  NECK: No pain or stiffness  RESPIRATORY: No cough, wheezing, or chills; No shortness of breath  CARDIOVASCULAR: No chest pain, palpitations, dizziness, or leg swelling  GASTROINTESTINAL: No abdominal or epigastric pain. No nausea or vomiting; No diarrhea or constipation.   GENITOURINARY: No dysuria, frequency, hematuria, or incontinence  NEUROLOGICAL: (+) occasional headaches, (+) numbness in LE secondary to anesthesia  SKIN: No itching, burning, rashes, or lesions   MUSCULOSKELETAL: Right knee pain  PSYCHIATRIC: No depression, anxiety, mood swings, or difficulty sleeping    MEDICATIONS  (STANDING):  sodium chloride 0.9% lock flush 3 milliLiter(s) IV Push every 8 hours    MEDICATIONS  (PRN):  HYDROmorphone  Injectable 0.5 milliGRAM(s) IV Push every 10 minutes PRN Severe Pain (7 - 10)  morphine  - Injectable 4 milliGRAM(s) IV Push every 15 minutes PRN Moderate Pain (4 - 6)  ondansetron Injectable 4 milliGRAM(s) IV Push once PRN Nausea and/or Vomiting  promethazine IVPB 6.25 milliGRAM(s) IV Intermittent once PRN Nausea and/or Vomiting      Vital Signs Last 24 Hrs  T(C): 36.2 (09 Nov 2017 13:17), Max: 36.7 (09 Nov 2017 09:25)  T(F): 97.2 (09 Nov 2017 13:17), Max: 98.1 (09 Nov 2017 09:25)  HR: 81 (09 Nov 2017 13:32) (75 - 83)  BP: 107/64 (09 Nov 2017 13:32) (98/57 - 132/71)  BP(mean): --  RR: 14 (09 Nov 2017 13:32) (13 - 16)  SpO2: 98% (09 Nov 2017 13:32) (98% - 100%)    PHYSICAL EXAM:    GENERAL: NAD, well-groomed, well-developed  HEAD:  Atraumatic, Normocephalic  EYES: EOMI, PERRLA, conjunctiva and sclera clear  NECK: Supple, No JVD, Normal thyroid  NERVOUS SYSTEM:  Alert & Oriented X3, Good concentration; no focal deficits  CHEST/LUNG: CTA  b/l,  no rales, rhonchi, wheezing, or rubs  HEART: Regular rate and rhythm; No murmurs, rubs, or gallops  ABDOMEN: Soft, Nontender, Nondistended; Bowel sounds present  EXTREMITIES:  2+ Peripheral Pulses, No clubbing, cyanosis, or edema , Right knee with clean dressing and ACE wrap  SKIN: No rashes or lesions    LABS:  Complete Blood Count + Automated Diff (10.18.17 @ 10:46)    WBC Count: 7.0 K/uL    RBC Count: 4.90 M/uL    Hemoglobin: 14.0 g/dL    Hematocrit: 44.7 %    Mean Cell Volume: 91.2 fl    Mean Cell Hemoglobin: 28.6 pg    Mean Cell Hemoglobin Conc: 31.3 g/dL    Red Cell Distrib Width: 14.9 %    Platelet Count - Automated: 230 K/uL    Auto Neutrophil #: 4.2 K/uL    Auto Lymphocyte #: 1.6 K/uL    Auto Monocyte #: 0.7 K/uL    Auto Eosinophil #: 0.3 K/uL    Auto Basophil #: 0.0 K/uL    Auto Neutrophil %: 60.8: Differential percentages must be correlated with absolute numbers for  clinical significance. %    Auto Lymphocyte %: 23.7 %    Auto Monocyte %: 9.9 %    Auto Eosinophil %: 4.9 %    Auto Basophil %: 0.4 %    Comprehensive Metabolic Panel (10.18.17 @ 10:46)    Sodium, Serum: 143 mmol/L    Potassium, Serum: 4.4 mmol/L    Chloride, Serum: 102 mmol/L    Carbon Dioxide, Serum: 29.0 mmol/L    Anion Gap, Serum: 12 mmol/L    Blood Urea Nitrogen, Serum: 14.0 mg/dL    Creatinine, Serum: 0.50 mg/dL    Glucose, Serum: 85 mg/dL    Calcium, Total Serum: 9.7 mg/dL    Protein Total, Serum: 7.1 g/dL    Albumin, Serum: 4.2 g/dL    Bilirubin Total, Serum: 0.5 mg/dL    Alkaline Phosphatase, Serum: 83 U/L    Aspartate Aminotransferase (AST/SGOT): 16 U/L    Alanine Aminotransferase (ALT/SGPT): 15 U/L    eGFR if Non : 98:     eGFR if African American: 114 mL/min/1.73M2

## 2017-11-09 NOTE — DISCHARGE NOTE ADULT - CARE PROVIDER_API CALL
Washington Ugarte), Orthopaedic Surgery  [address] [place] [address]  Phone: [phone]  Fax: [phone]

## 2017-11-09 NOTE — BRIEF OPERATIVE NOTE - PROCEDURE
<<-----Click on this checkbox to enter Procedure TKA (total knee arthroplasty)  11/09/2017    Active  MNETT

## 2017-11-09 NOTE — PHYSICAL THERAPY INITIAL EVALUATION ADULT - IMPAIRMENTS CONTRIBUTING IMPAIRED BED MOBILITY, REHAB EVAL
impaired balance/impaired postural control/decreased ROM/impaired sensory feedback/decreased strength

## 2017-11-09 NOTE — PROGRESS NOTE ADULT - SUBJECTIVE AND OBJECTIVE BOX
PA - Note    Ortho Post Op Check    Name: DILLON MOORE    MR #: [id number]    Procedure: Right Total Knee Arthroplasty  Surgeon: Washington Ugarte    Pt comfortable without complaints, pain controlled  Denies CP, SOB, N/V, numbness/tingling     General Exam:  Vital Signs Last 24 Hrs  T(C): 36.9 (11-09-17 @ 19:47), Max: 36.9 (11-09-17 @ 19:47)  T(F): 98.5 (11-09-17 @ 19:47), Max: 98.5 (11-09-17 @ 19:47)  HR: 72 (11-09-17 @ 19:47) (72 - 72)  BP: 108/63 (11-09-17 @ 19:47) (108/63 - 108/63)  BP(mean): --  RR: 18 (11-09-17 @ 19:47) (18 - 18)  SpO2: 97% (11-09-17 @ 19:47) (97% - 97%)    General: Pt Alert and oriented, NAD, controlled pain.  Dressings C/D/I. No bleeding.  Pulses: 2+ dorsalis pedis pulse. Cap refill < 2 sec.  Sensation: Grossly intact to light touch without deficit.  Motor: + ROM of toes, + Dorsal/Plantar Flexion of Foot    Post-op X-Ray:  EXAM:  KNEE-RIGHT                          PROCEDURE DATE:  11/09/2017          INTERPRETATION:  X-RAY right KNEE:    HISTORY: Post-operative.    DATE: 11/9/2017    TECHNIQUE: AP and Lateral views were obtained.    FINDINGS: Status post right total knee replacement. Alignment appears to   be essentially anatomic. Air is noted in the soft tissues in keeping with   recent surgery.    IMPRESSION:    Status post total right knee replacement.                  JENNIFER GOFF M.D., ATTENDING RADIOLOGIST  This document has been electronically signed. Nov 9 2017  3:08PM      A/P: 72yFemale POD#0 s/p Right Total Knee Arthroplasty  - Stable  - Pain Control  - DVT ppx: Aspirin 325mg BID, SCDs  - Post op abx: Ancef, Vancomycin  - Continue with PT Protocol for Total Knee Arthroplasty  - Weight bearing status: WBAT  - D/C Planning (Home)

## 2017-11-09 NOTE — PHYSICAL THERAPY INITIAL EVALUATION ADULT - GENERAL OBSERVATIONS, REHAB EVAL
Pt received supine on stretcher in PACU, + telemetry, + IV + Venous Compression Boots no c/o pain, agreeable to PT

## 2017-11-09 NOTE — PHYSICAL THERAPY INITIAL EVALUATION ADULT - ADDITIONAL COMMENTS
Pt lives in a private home with her spouse and daughter. 3 steps to enter with handrails, 13 steps inside with handrails. Pt was independent PTA without assist device. Pt owns RW, SAC, Commode, Shower chair.

## 2017-11-09 NOTE — DISCHARGE NOTE ADULT - NS AS ACTIVITY OBS
No Heavy lifting/straining/Stairs allowed/Showering allowed/Do not make important decisions/Walking-Outdoors allowed/Do not drive or operate machinery/Walking-Indoors allowed

## 2017-11-10 ENCOUNTER — TRANSCRIPTION ENCOUNTER (OUTPATIENT)
Age: 72
End: 2017-11-10

## 2017-11-10 LAB
ANION GAP SERPL CALC-SCNC: 8 MMOL/L — SIGNIFICANT CHANGE UP (ref 5–17)
BUN SERPL-MCNC: 12 MG/DL — SIGNIFICANT CHANGE UP (ref 8–20)
CALCIUM SERPL-MCNC: 8.3 MG/DL — LOW (ref 8.6–10.2)
CHLORIDE SERPL-SCNC: 105 MMOL/L — SIGNIFICANT CHANGE UP (ref 98–107)
CO2 SERPL-SCNC: 28 MMOL/L — SIGNIFICANT CHANGE UP (ref 22–29)
CREAT SERPL-MCNC: 0.46 MG/DL — LOW (ref 0.5–1.3)
GLUCOSE SERPL-MCNC: 110 MG/DL — SIGNIFICANT CHANGE UP (ref 70–115)
HCT VFR BLD CALC: 35.1 % — LOW (ref 37–47)
HGB BLD-MCNC: 11.2 G/DL — LOW (ref 12–16)
MCHC RBC-ENTMCNC: 29.4 PG — SIGNIFICANT CHANGE UP (ref 27–31)
MCHC RBC-ENTMCNC: 31.9 G/DL — LOW (ref 32–36)
MCV RBC AUTO: 92.1 FL — SIGNIFICANT CHANGE UP (ref 81–99)
PLATELET # BLD AUTO: 189 K/UL — SIGNIFICANT CHANGE UP (ref 150–400)
POTASSIUM SERPL-MCNC: 4.2 MMOL/L — SIGNIFICANT CHANGE UP (ref 3.5–5.3)
POTASSIUM SERPL-SCNC: 4.2 MMOL/L — SIGNIFICANT CHANGE UP (ref 3.5–5.3)
RBC # BLD: 3.81 M/UL — LOW (ref 4.4–5.2)
RBC # FLD: 14.9 % — SIGNIFICANT CHANGE UP (ref 11–15.6)
SODIUM SERPL-SCNC: 141 MMOL/L — SIGNIFICANT CHANGE UP (ref 135–145)
WBC # BLD: 9.7 K/UL — SIGNIFICANT CHANGE UP (ref 4.8–10.8)
WBC # FLD AUTO: 9.7 K/UL — SIGNIFICANT CHANGE UP (ref 4.8–10.8)

## 2017-11-10 PROCEDURE — 99232 SBSQ HOSP IP/OBS MODERATE 35: CPT

## 2017-11-10 RX ORDER — SODIUM CHLORIDE 9 MG/ML
1000 INJECTION INTRAMUSCULAR; INTRAVENOUS; SUBCUTANEOUS
Qty: 0 | Refills: 0 | Status: DISCONTINUED | OUTPATIENT
Start: 2017-11-10 | End: 2017-11-12

## 2017-11-10 RX ADMIN — HYDROMORPHONE HYDROCHLORIDE 2 MILLIGRAM(S): 2 INJECTION INTRAMUSCULAR; INTRAVENOUS; SUBCUTANEOUS at 05:15

## 2017-11-10 RX ADMIN — POLYETHYLENE GLYCOL 3350 17 GRAM(S): 17 POWDER, FOR SOLUTION ORAL at 13:37

## 2017-11-10 RX ADMIN — Medication 325 MILLIGRAM(S): at 08:57

## 2017-11-10 RX ADMIN — OXYCODONE HYDROCHLORIDE 10 MILLIGRAM(S): 5 TABLET ORAL at 05:12

## 2017-11-10 RX ADMIN — Medication 100 MILLIGRAM(S): at 02:26

## 2017-11-10 RX ADMIN — HYDROMORPHONE HYDROCHLORIDE 2 MILLIGRAM(S): 2 INJECTION INTRAMUSCULAR; INTRAVENOUS; SUBCUTANEOUS at 16:31

## 2017-11-10 RX ADMIN — CELECOXIB 200 MILLIGRAM(S): 200 CAPSULE ORAL at 18:10

## 2017-11-10 RX ADMIN — Medication 15 MILLIGRAM(S): at 11:50

## 2017-11-10 RX ADMIN — OXYCODONE HYDROCHLORIDE 10 MILLIGRAM(S): 5 TABLET ORAL at 18:11

## 2017-11-10 RX ADMIN — Medication 15 MILLIGRAM(S): at 12:29

## 2017-11-10 RX ADMIN — HYDROMORPHONE HYDROCHLORIDE 2 MILLIGRAM(S): 2 INJECTION INTRAMUSCULAR; INTRAVENOUS; SUBCUTANEOUS at 12:00

## 2017-11-10 RX ADMIN — OXYCODONE HYDROCHLORIDE 10 MILLIGRAM(S): 5 TABLET ORAL at 08:59

## 2017-11-10 RX ADMIN — Medication 100 MILLIGRAM(S): at 13:37

## 2017-11-10 RX ADMIN — HYDROMORPHONE HYDROCHLORIDE 2 MILLIGRAM(S): 2 INJECTION INTRAMUSCULAR; INTRAVENOUS; SUBCUTANEOUS at 17:00

## 2017-11-10 RX ADMIN — Medication 650 MILLIGRAM(S): at 05:44

## 2017-11-10 RX ADMIN — SODIUM CHLORIDE 3 MILLILITER(S): 9 INJECTION INTRAMUSCULAR; INTRAVENOUS; SUBCUTANEOUS at 19:58

## 2017-11-10 RX ADMIN — OXYCODONE HYDROCHLORIDE 10 MILLIGRAM(S): 5 TABLET ORAL at 05:44

## 2017-11-10 RX ADMIN — Medication 650 MILLIGRAM(S): at 05:13

## 2017-11-10 RX ADMIN — HYDROMORPHONE HYDROCHLORIDE 2 MILLIGRAM(S): 2 INJECTION INTRAMUSCULAR; INTRAVENOUS; SUBCUTANEOUS at 12:29

## 2017-11-10 RX ADMIN — Medication 325 MILLIGRAM(S): at 18:10

## 2017-11-10 RX ADMIN — OXYCODONE HYDROCHLORIDE 10 MILLIGRAM(S): 5 TABLET ORAL at 19:57

## 2017-11-10 RX ADMIN — HYDROMORPHONE HYDROCHLORIDE 2 MILLIGRAM(S): 2 INJECTION INTRAMUSCULAR; INTRAVENOUS; SUBCUTANEOUS at 00:30

## 2017-11-10 RX ADMIN — HYDROMORPHONE HYDROCHLORIDE 2 MILLIGRAM(S): 2 INJECTION INTRAMUSCULAR; INTRAVENOUS; SUBCUTANEOUS at 04:30

## 2017-11-10 RX ADMIN — CELECOXIB 200 MILLIGRAM(S): 200 CAPSULE ORAL at 19:24

## 2017-11-10 RX ADMIN — Medication 650 MILLIGRAM(S): at 13:37

## 2017-11-10 RX ADMIN — Medication 650 MILLIGRAM(S): at 19:24

## 2017-11-10 RX ADMIN — SODIUM CHLORIDE 3 MILLILITER(S): 9 INJECTION INTRAMUSCULAR; INTRAVENOUS; SUBCUTANEOUS at 05:10

## 2017-11-10 RX ADMIN — Medication 650 MILLIGRAM(S): at 18:10

## 2017-11-10 RX ADMIN — Medication 100 MILLIGRAM(S): at 19:58

## 2017-11-10 RX ADMIN — OXYCODONE HYDROCHLORIDE 10 MILLIGRAM(S): 5 TABLET ORAL at 20:30

## 2017-11-10 RX ADMIN — OXYCODONE HYDROCHLORIDE 10 MILLIGRAM(S): 5 TABLET ORAL at 19:24

## 2017-11-10 RX ADMIN — Medication 1000 MILLIGRAM(S): at 19:24

## 2017-11-10 RX ADMIN — ONDANSETRON 4 MILLIGRAM(S): 8 TABLET, FILM COATED ORAL at 18:07

## 2017-11-10 RX ADMIN — CELECOXIB 200 MILLIGRAM(S): 200 CAPSULE ORAL at 08:57

## 2017-11-10 RX ADMIN — Medication 650 MILLIGRAM(S): at 00:00

## 2017-11-10 RX ADMIN — Medication 650 MILLIGRAM(S): at 14:07

## 2017-11-10 RX ADMIN — OXYCODONE HYDROCHLORIDE 10 MILLIGRAM(S): 5 TABLET ORAL at 09:28

## 2017-11-10 RX ADMIN — Medication 100 MILLIGRAM(S): at 05:12

## 2017-11-10 NOTE — PROGRESS NOTE ADULT - SUBJECTIVE AND OBJECTIVE BOX
Pt Name: DILLON MOORE    MRN: [id number]      Patient is a 72F s/p right total knee arthroplasty POD #1. Pt found resting comfortably in bed in NAD at this time. Pts pain controlled with medications, no issues overnight. Pt denies c/p, sob, abdominal pain, n/v/c/d, numbness/tingling and has no acute complaints.      Allergies: No Known Allergies      Medications: acetaminophen   Tablet 650 milliGRAM(s) Oral every 6 hours PRN  acetaminophen   Tablet. 650 milliGRAM(s) Oral every 6 hours  aluminum hydroxide/magnesium hydroxide/simethicone Suspension 30 milliLiter(s) Oral four times a day PRN  aspirin enteric coated 325 milliGRAM(s) Oral two times a day  celecoxib 200 milliGRAM(s) Oral two times a day with meals  docusate sodium 100 milliGRAM(s) Oral three times a day  HYDROmorphone   Tablet 2 milliGRAM(s) Oral every 3 hours PRN  HYDROmorphone  Injectable 0.5 milliGRAM(s) IV Push every 4 hours PRN  ketorolac   Injectable 15 milliGRAM(s) IV Push every 6 hours PRN  lactated ringers. 1000 milliLiter(s) IV Continuous <Continuous>  magnesium hydroxide Suspension 30 milliLiter(s) Oral daily PRN  ondansetron Injectable 4 milliGRAM(s) IV Push every 6 hours PRN  oxyCODONE    IR 5 milliGRAM(s) Oral every 4 hours PRN  oxyCODONE    IR 10 milliGRAM(s) Oral every 4 hours PRN  oxyCODONE  ER Tablet 10 milliGRAM(s) Oral every 12 hours  polyethylene glycol 3350 17 Gram(s) Oral daily  senna 2 Tablet(s) Oral at bedtime PRN  sodium chloride 0.9% lock flush 3 milliLiter(s) IV Push every 8 hours                  PHYSICAL EXAM:    Vital Signs Last 24 Hrs  T(C): 37.1 (10 Nov 2017 04:18), Max: 37.1 (10 Nov 2017 04:18)  T(F): 98.7 (10 Nov 2017 04:18), Max: 98.7 (10 Nov 2017 04:18)  HR: 82 (10 Nov 2017 04:18) (63 - 84)  BP: 93/58 (10 Nov 2017 04:18) (93/58 - 132/71)  BP(mean): --  RR: 18 (10 Nov 2017 04:18) (13 - 24)  SpO2: 96% (10 Nov 2017 04:18) (96% - 100%)  Daily Height in cm: 160.02 (09 Nov 2017 09:25)    Daily     Appearance: Alert, responsive, in no acute distress.    Neurological: Sensation is grossly intact to light touch. 5/5 motor function of all extremities. No focal deficits or weaknesses found. No foot drop.    Skin: no rash on visible skin. Skin is clean, dry and intact. No bleeding. No abrasions. No ulcerations.    Dressing: Ace wrap applied with no staining noted to bandage.    Vascular: 2+ distal pulses. Cap refill < 2 sec. No signs of venous   insufficiency   or stasis. No extremity ulcerations. No cyanosis.    Musculoskeletal:         Left Upper Extremity:  + NROM. Non-tender. No signs of trauma.        Right Upper Extremity:  + NROM. Non-tender. No signs of trauma.        Left Lower Extremity:  + NROM. Non-tender. No signs of trauma. Compartments soft and compressive, no calf tenderness.       Right Lower Extremity: +plantar/dorsiflexion, able to move toes, compartments soft and compressive, passive ROM tolerated, no calf tenderness.      A/P:  Pt is a  72y Female s/p right total knee arthroplasty POD #1    PLAN:   * Stable  * Pain Control as clinically indicated  * DVT ppx: Aspirin 325mg BID, SCDs  * Physical Therapy  * Weight bearing status: WBAT  * D/C Planning (Home)

## 2017-11-10 NOTE — PROGRESS NOTE ADULT - SUBJECTIVE AND OBJECTIVE BOX
Pt seen, chart reviewed.  S/p Right TKA, POD#1.  VSS.  Adequate pain control.  Resting comfortably.   Tolerating PO intake.  Mild N/V Noted.  No anesthesia problems noted.

## 2017-11-10 NOTE — PROGRESS NOTE ADULT - ASSESSMENT
72 year old female presents with a several year history of bilateral knee pain, right is worse than left. 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 Pain increased  with stairs and walking. Pt fell in July walking down stairs . Ambulates without assistance, takes Tylenol for pain.  Now s/p R TKA , POD # 1      Problem/Recommendation - 1:  Problem: Primary osteoarthritis of right knee. Recommendation: s/p Right TKA POD #0  Pain control.     Problem/Recommendation - 2:  ·  Problem: Status post right knee replacement.  Recommendation: Pain control.  PT eval.  Antibiotics, wound care and DVT prophylaxis as per Ortho.  Incentive Spirometry.  Avoid opioid induced constipation.      Problem/Recommendation - 3:  ·  Problem: Compression fracture of L2.  Recommendation: Pain control.  PT.      Problem/Recommendation - 4:  ·  Problem: Prophylactic measure.  Recommendation: ASA BID as per Ortho.     Problem / Plan - 5: Anemia - likely ABLA - asymptomatic , follow CBC in am     Problem / Plan -6: Postoperative hypotensive episode - patient lost iv line early this am - not on ivf , spoke to nurse will get new iv line , hydrate , observe - patient is asymptomatic .

## 2017-11-10 NOTE — PROGRESS NOTE ADULT - SUBJECTIVE AND OBJECTIVE BOX
Patient seen and examined . S/p  , POD #     CC :     HPI:      PAST MEDICAL & SURGICAL HISTORY:  Knee pain  Compression fracture of L2  Arthritis  Cyst of clavicle: benign  Disorder of wrist joint: left wrist tendon  2017  Bunion: great toe bilaterally , left foot small toe      MEDICATIONS  (STANDING):  acetaminophen   Tablet. 650 milliGRAM(s) Oral every 6 hours  aspirin enteric coated 325 milliGRAM(s) Oral two times a day  celecoxib 200 milliGRAM(s) Oral two times a day with meals  docusate sodium 100 milliGRAM(s) Oral three times a day  oxyCODONE  ER Tablet 10 milliGRAM(s) Oral every 12 hours  polyethylene glycol 3350 17 Gram(s) Oral daily  sodium chloride 0.9% lock flush 3 milliLiter(s) IV Push every 8 hours    MEDICATIONS  (PRN):  acetaminophen   Tablet 650 milliGRAM(s) Oral every 6 hours PRN For Temp over 38.3 C (100.94 F)  aluminum hydroxide/magnesium hydroxide/simethicone Suspension 30 milliLiter(s) Oral four times a day PRN Indigestion  HYDROmorphone   Tablet 2 milliGRAM(s) Oral every 3 hours PRN Severe Pain (7 - 10)  HYDROmorphone  Injectable 0.5 milliGRAM(s) IV Push every 4 hours PRN breakthrough pain  ketorolac   Injectable 15 milliGRAM(s) IV Push every 6 hours PRN Moderate Pain (4 - 6)  magnesium hydroxide Suspension 30 milliLiter(s) Oral daily PRN Constipation  ondansetron Injectable 4 milliGRAM(s) IV Push every 6 hours PRN Nausea and/or Vomiting  oxyCODONE    IR 5 milliGRAM(s) Oral every 4 hours PRN Mild Pain  oxyCODONE    IR 10 milliGRAM(s) Oral every 4 hours PRN Moderate Pain  senna 2 Tablet(s) Oral at bedtime PRN Constipation      LABS:                          11.2   9.7   )-----------( 189      ( 10 Nov 2017 07:03 )             35.1     11-10    141  |  105  |  12.0  ----------------------------<  110  4.2   |  28.0  |  0.46<L>    Ca    8.3<L>      10 Nov 2017 07:03            RADIOLOGY & ADDITIONAL TESTS:      REVIEW OF SYSTEMS:    CONSTITUTIONAL: No fever, weight loss, or fatigue  EYES: No eye pain, visual disturbances, or discharge  ENMT:  No difficulty hearing, tinnitus, vertigo; No sinus or throat pain  NECK: No pain or stiffness  RESPIRATORY: No cough, wheezing, chills or hemoptysis; No shortness of breath  CARDIOVASCULAR: No chest pain, palpitations, dizziness, or leg swelling  GASTROINTESTINAL: No abdominal or epigastric pain. No nausea, vomiting, or hematemesis; No diarrhea or constipation. No melena or hematochezia.  GENITOURINARY: No dysuria, frequency, hematuria, or incontinence  NEUROLOGICAL: No headaches, memory loss, loss of strength, numbness, or tremors  SKIN: No itching, burning, rashes, or lesions   LYMPH NODES: No enlarged glands  ENDOCRINE: No heat or cold intolerance; No hair loss  MUSCULOSKELETAL:   PSYCHIATRIC: No depression, anxiety, mood swings, or difficulty sleeping  HEME/LYMPH: No easy bruising, or bleeding gums  ALLERGY AND IMMUNOLOGIC: No hives or eczema    Vital Signs Last 24 Hrs  T(C): 37.1 (10 Nov 2017 07:45), Max: 37.1 (10 Nov 2017 04:18)  T(F): 98.7 (10 Nov 2017 07:45), Max: 98.7 (10 Nov 2017 04:18)  HR: 87 (10 Nov 2017 07:45) (63 - 87)  BP: 90/44 (10 Nov 2017 07:45) (90/44 - 120/89)  BP(mean): --  RR: 18 (10 Nov 2017 07:45) (13 - 24)  SpO2: 92% (10 Nov 2017 07:45) (92% - 100%)  PHYSICAL EXAM:    GENERAL: NAD, well-groomed, well-developed  HEAD:  Atraumatic, Normocephalic  EYES: EOMI, PERRLA, conjunctiva and sclera clear  NECK: Supple, No JVD, Normal thyroid  NERVOUS SYSTEM:  Alert & Oriented X3, no focal deficit  CHEST/LUNG: CTA b/l ,  no  rales, rhonchi, wheezing, or rubs  HEART: Regular rate and rhythm; No murmurs, rubs, or gallops  ABDOMEN: Soft, Nontender, Nondistended; Bowel sounds present  EXTREMITIES:  2+ Peripheral Pulses, No clubbing, cyanosis, or edema  LYMPH: No lymphadenopathy noted  SKIN: No rashes or lesions Patient seen and examined . S/p  R TKA  , POD # 1    CC : R knee pain , well controlled , lost iv line  earlier this am , BP on lower side , no complaints       PAST MEDICAL & SURGICAL HISTORY:  Knee pain  Compression fracture of L2  Arthritis  Cyst of clavicle: benign  Disorder of wrist joint: left wrist tendon  2017  Bunion: great toe bilaterally , left foot small toe      MEDICATIONS  (STANDING):  acetaminophen   Tablet. 650 milliGRAM(s) Oral every 6 hours  aspirin enteric coated 325 milliGRAM(s) Oral two times a day  celecoxib 200 milliGRAM(s) Oral two times a day with meals  docusate sodium 100 milliGRAM(s) Oral three times a day  oxyCODONE  ER Tablet 10 milliGRAM(s) Oral every 12 hours  polyethylene glycol 3350 17 Gram(s) Oral daily  sodium chloride 0.9% lock flush 3 milliLiter(s) IV Push every 8 hours    MEDICATIONS  (PRN):  acetaminophen   Tablet 650 milliGRAM(s) Oral every 6 hours PRN For Temp over 38.3 C (100.94 F)  aluminum hydroxide/magnesium hydroxide/simethicone Suspension 30 milliLiter(s) Oral four times a day PRN Indigestion  HYDROmorphone   Tablet 2 milliGRAM(s) Oral every 3 hours PRN Severe Pain (7 - 10)  HYDROmorphone  Injectable 0.5 milliGRAM(s) IV Push every 4 hours PRN breakthrough pain  ketorolac   Injectable 15 milliGRAM(s) IV Push every 6 hours PRN Moderate Pain (4 - 6)  magnesium hydroxide Suspension 30 milliLiter(s) Oral daily PRN Constipation  ondansetron Injectable 4 milliGRAM(s) IV Push every 6 hours PRN Nausea and/or Vomiting  oxyCODONE    IR 5 milliGRAM(s) Oral every 4 hours PRN Mild Pain  oxyCODONE    IR 10 milliGRAM(s) Oral every 4 hours PRN Moderate Pain  senna 2 Tablet(s) Oral at bedtime PRN Constipation      LABS:                          11.2   9.7   )-----------( 189      ( 10 Nov 2017 07:03 )             35.1     11-10    141  |  105  |  12.0  ----------------------------<  110  4.2   |  28.0  |  0.46<L>    Ca    8.3<L>      10 Nov 2017 07:03      RADIOLOGY & ADDITIONAL TESTS:    < from: Xray Knee 1 or 2 Views, Right (11.09.17 @ 14:58) >     EXAM:  KNEE-RIGHT                          PROCEDURE DATE:  11/09/2017          INTERPRETATION:  X-RAY right KNEE:    HISTORY: Post-operative.    DATE: 11/9/2017    TECHNIQUE: AP and Lateral views were obtained.    FINDINGS: Status post right total knee replacement. Alignment appears to   be essentially anatomic. Air is noted in the soft tissues in keeping with   recent surgery.    IMPRESSION:    Status post total right knee replacement.        JENNIFER GOFF M.D., ATTENDING RADIOLOGIST  This document has been electronically signed. Nov 9 2017  3:08PM        < end of copied text >        REVIEW OF SYSTEMS:    CONSTITUTIONAL: No fever, weight loss, or fatigue  EYES: No eye pain, visual disturbances, or discharge  ENMT:  No difficulty hearing, tinnitus, vertigo; No sinus or throat pain  NECK: No pain or stiffness  RESPIRATORY: No cough, wheezing, chills or hemoptysis; No shortness of breath  CARDIOVASCULAR: No chest pain, palpitations, dizziness, or leg swelling  GASTROINTESTINAL: No abdominal or epigastric pain. No nausea, vomiting, or hematemesis; No diarrhea or constipation. No melena or hematochezia.  GENITOURINARY: No dysuria, frequency, hematuria, or incontinence  NEUROLOGICAL: No headaches, memory loss, loss of strength, numbness, or tremors  SKIN: No itching, burning, rashes, or lesions   LYMPH NODES: No enlarged glands  ENDOCRINE: No heat or cold intolerance; No hair loss  MUSCULOSKELETAL: R knee pain well controlled   PSYCHIATRIC: No depression, anxiety, mood swings, or difficulty sleeping  HEME/LYMPH: No easy bruising, or bleeding gums  ALLERGY AND IMMUNOLOGIC: No hives or eczema    Vital Signs Last 24 Hrs  T(C): 37.1 (10 Nov 2017 07:45), Max: 37.1 (10 Nov 2017 04:18)  T(F): 98.7 (10 Nov 2017 07:45), Max: 98.7 (10 Nov 2017 04:18)  HR: 87 (10 Nov 2017 07:45) (63 - 87)  BP: 90/44 (10 Nov 2017 07:45) (90/44 - 120/89)  BP(mean): --  RR: 18 (10 Nov 2017 07:45) (13 - 24)  SpO2: 92% (10 Nov 2017 07:45) (92% - 100%)  PHYSICAL EXAM:    GENERAL: NAD, well-groomed, well-developed  HEAD:  Atraumatic, Normocephalic  EYES: EOMI, PERRLA, conjunctiva and sclera clear  NECK: Supple, No JVD, Normal thyroid  NERVOUS SYSTEM:  Alert & Oriented X3, no focal deficit  CHEST/LUNG: CTA b/l ,  no  rales, rhonchi, wheezing, or rubs  HEART: Regular rate and rhythm; No murmurs, rubs, or gallops  ABDOMEN: Soft, Nontender, Nondistended; Bowel sounds present  EXTREMITIES:  2+ Peripheral Pulses, No clubbing, cyanosis, or edema , R knee ACE dressing + , clean and dry  LYMPH: No lymphadenopathy noted  SKIN: No rashes or lesions

## 2017-11-11 LAB
ANION GAP SERPL CALC-SCNC: 9 MMOL/L — SIGNIFICANT CHANGE UP (ref 5–17)
BUN SERPL-MCNC: 11 MG/DL — SIGNIFICANT CHANGE UP (ref 8–20)
CALCIUM SERPL-MCNC: 8.7 MG/DL — SIGNIFICANT CHANGE UP (ref 8.6–10.2)
CHLORIDE SERPL-SCNC: 100 MMOL/L — SIGNIFICANT CHANGE UP (ref 98–107)
CO2 SERPL-SCNC: 29 MMOL/L — SIGNIFICANT CHANGE UP (ref 22–29)
CREAT SERPL-MCNC: 0.56 MG/DL — SIGNIFICANT CHANGE UP (ref 0.5–1.3)
GLUCOSE SERPL-MCNC: 104 MG/DL — SIGNIFICANT CHANGE UP (ref 70–115)
HCT VFR BLD CALC: 33.4 % — LOW (ref 37–47)
HGB BLD-MCNC: 10.7 G/DL — LOW (ref 12–16)
MCHC RBC-ENTMCNC: 29 PG — SIGNIFICANT CHANGE UP (ref 27–31)
MCHC RBC-ENTMCNC: 32 G/DL — SIGNIFICANT CHANGE UP (ref 32–36)
MCV RBC AUTO: 90.5 FL — SIGNIFICANT CHANGE UP (ref 81–99)
PLATELET # BLD AUTO: 180 K/UL — SIGNIFICANT CHANGE UP (ref 150–400)
POTASSIUM SERPL-MCNC: 4.3 MMOL/L — SIGNIFICANT CHANGE UP (ref 3.5–5.3)
POTASSIUM SERPL-SCNC: 4.3 MMOL/L — SIGNIFICANT CHANGE UP (ref 3.5–5.3)
RBC # BLD: 3.69 M/UL — LOW (ref 4.4–5.2)
RBC # FLD: 14.8 % — SIGNIFICANT CHANGE UP (ref 11–15.6)
SODIUM SERPL-SCNC: 138 MMOL/L — SIGNIFICANT CHANGE UP (ref 135–145)
WBC # BLD: 11 K/UL — HIGH (ref 4.8–10.8)
WBC # FLD AUTO: 11 K/UL — HIGH (ref 4.8–10.8)

## 2017-11-11 PROCEDURE — 99232 SBSQ HOSP IP/OBS MODERATE 35: CPT

## 2017-11-11 RX ORDER — SENNOSIDES/DOCUSATE SODIUM 8.6MG-50MG
2 TABLET ORAL
Qty: 28 | Refills: 0 | OUTPATIENT
Start: 2017-11-11 | End: 2017-11-25

## 2017-11-11 RX ORDER — OXYCODONE HYDROCHLORIDE 5 MG/1
1 TABLET ORAL
Qty: 42 | Refills: 0 | OUTPATIENT
Start: 2017-11-11

## 2017-11-11 RX ORDER — ONDANSETRON 8 MG/1
4 TABLET, FILM COATED ORAL THREE TIMES A DAY
Qty: 0 | Refills: 0 | Status: DISCONTINUED | OUTPATIENT
Start: 2017-11-11 | End: 2017-11-12

## 2017-11-11 RX ORDER — ASPIRIN/CALCIUM CARB/MAGNESIUM 324 MG
1 TABLET ORAL
Qty: 90 | Refills: 0 | OUTPATIENT
Start: 2017-11-11

## 2017-11-11 RX ADMIN — Medication 650 MILLIGRAM(S): at 18:03

## 2017-11-11 RX ADMIN — Medication 650 MILLIGRAM(S): at 13:00

## 2017-11-11 RX ADMIN — CELECOXIB 200 MILLIGRAM(S): 200 CAPSULE ORAL at 11:09

## 2017-11-11 RX ADMIN — Medication 325 MILLIGRAM(S): at 05:29

## 2017-11-11 RX ADMIN — Medication 15 MILLIGRAM(S): at 05:29

## 2017-11-11 RX ADMIN — Medication 650 MILLIGRAM(S): at 20:00

## 2017-11-11 RX ADMIN — OXYCODONE HYDROCHLORIDE 10 MILLIGRAM(S): 5 TABLET ORAL at 20:00

## 2017-11-11 RX ADMIN — OXYCODONE HYDROCHLORIDE 10 MILLIGRAM(S): 5 TABLET ORAL at 18:04

## 2017-11-11 RX ADMIN — Medication 650 MILLIGRAM(S): at 12:25

## 2017-11-11 RX ADMIN — Medication 650 MILLIGRAM(S): at 06:05

## 2017-11-11 RX ADMIN — Medication 650 MILLIGRAM(S): at 05:29

## 2017-11-11 RX ADMIN — SODIUM CHLORIDE 3 MILLILITER(S): 9 INJECTION INTRAMUSCULAR; INTRAVENOUS; SUBCUTANEOUS at 14:02

## 2017-11-11 RX ADMIN — OXYCODONE HYDROCHLORIDE 10 MILLIGRAM(S): 5 TABLET ORAL at 22:17

## 2017-11-11 RX ADMIN — OXYCODONE HYDROCHLORIDE 5 MILLIGRAM(S): 5 TABLET ORAL at 10:39

## 2017-11-11 RX ADMIN — CELECOXIB 200 MILLIGRAM(S): 200 CAPSULE ORAL at 18:03

## 2017-11-11 RX ADMIN — Medication 325 MILLIGRAM(S): at 18:03

## 2017-11-11 RX ADMIN — Medication 100 MILLIGRAM(S): at 12:16

## 2017-11-11 RX ADMIN — SODIUM CHLORIDE 3 MILLILITER(S): 9 INJECTION INTRAMUSCULAR; INTRAVENOUS; SUBCUTANEOUS at 05:32

## 2017-11-11 RX ADMIN — CELECOXIB 200 MILLIGRAM(S): 200 CAPSULE ORAL at 10:37

## 2017-11-11 RX ADMIN — CELECOXIB 200 MILLIGRAM(S): 200 CAPSULE ORAL at 20:00

## 2017-11-11 RX ADMIN — OXYCODONE HYDROCHLORIDE 10 MILLIGRAM(S): 5 TABLET ORAL at 23:00

## 2017-11-11 RX ADMIN — POLYETHYLENE GLYCOL 3350 17 GRAM(S): 17 POWDER, FOR SOLUTION ORAL at 12:16

## 2017-11-11 RX ADMIN — Medication 15 MILLIGRAM(S): at 06:05

## 2017-11-11 RX ADMIN — OXYCODONE HYDROCHLORIDE 5 MILLIGRAM(S): 5 TABLET ORAL at 06:05

## 2017-11-11 RX ADMIN — OXYCODONE HYDROCHLORIDE 10 MILLIGRAM(S): 5 TABLET ORAL at 06:05

## 2017-11-11 RX ADMIN — SODIUM CHLORIDE 3 MILLILITER(S): 9 INJECTION INTRAMUSCULAR; INTRAVENOUS; SUBCUTANEOUS at 22:18

## 2017-11-11 RX ADMIN — Medication 100 MILLIGRAM(S): at 05:29

## 2017-11-11 RX ADMIN — SENNA PLUS 2 TABLET(S): 8.6 TABLET ORAL at 18:03

## 2017-11-11 RX ADMIN — OXYCODONE HYDROCHLORIDE 10 MILLIGRAM(S): 5 TABLET ORAL at 05:29

## 2017-11-11 RX ADMIN — Medication 100 MILLIGRAM(S): at 22:16

## 2017-11-11 RX ADMIN — OXYCODONE HYDROCHLORIDE 5 MILLIGRAM(S): 5 TABLET ORAL at 11:09

## 2017-11-11 RX ADMIN — OXYCODONE HYDROCHLORIDE 5 MILLIGRAM(S): 5 TABLET ORAL at 05:29

## 2017-11-11 NOTE — PROGRESS NOTE ADULT - ASSESSMENT
72 year old female presents with a several year history of bilateral knee pain, right is worse than left.  Now s/p R TKA , POD # 2     Problem/Recommendation - 1:  Problem: Primary osteoarthritis of right knee. Recommendation: s/p Right TKA POD #0  Pain control.     Problem/Recommendation - 2:  ·  Problem: Status post right knee replacement.  Recommendation: Pain control.  PT eval.  Antibiotics, wound care and DVT prophylaxis as per Ortho.  Incentive Spirometry.  Avoid opioid induced constipation.      Problem/Recommendation - 3:  ·  Problem: Compression fracture of L2.  Recommendation: Pain control.  PT.     Problem / Plan - 4: Anemia - likely ABLA - asymptomatic , stable    Problem / Plan -5: Postoperative hypotensive episode - resolved with iv fluids. 72 year old female presents with a several year history of bilateral knee pain, right is worse than left.  Now s/p R TKA , POD # 2.      Problem/Recommendation - 1:  Problem: Primary osteoarthritis of right knee. Recommendation: s/p Right TKA POD #2  Pain control.     Problem/Recommendation - 2:  ·  Problem: Status post right knee replacement.  Recommendation: Pain control.  PT eval.  Antibiotics, wound care and DVT prophylaxis as per Ortho.  Incentive Spirometry.  Avoid opioid induced constipation.      Problem/Recommendation - 3:  ·  Problem: Compression fracture of L2.  Recommendation: Pain control.  PT.     Problem / Plan - 4: Anemia - likely ABLA - asymptomatic , stable    Problem / Plan -5: Postoperative hypotensive episode - resolved with iv fluids.     Problem ; Post operative Nausea - s/s narcotics, may use Zofran prn prior to meals - if able to tolerate dinner - may be discharged home later today.

## 2017-11-11 NOTE — PROGRESS NOTE ADULT - SUBJECTIVE AND OBJECTIVE BOX
DILLON MOORE    [id number]    72y      Female    CC: /p  R TKA  , POD # 2    INTERVAL HPI/OVERNIGHT EVENTS: no acute events    REVIEW OF SYSTEMS:    CONSTITUTIONAL: No fever, weight loss, or fatigue  RESPIRATORY: No cough, wheezing, hemoptysis; No shortness of breath  CARDIOVASCULAR: No chest pain, palpitations  GASTROINTESTINAL: No abdominal or epigastric pain. No nausea, vomiting  NEUROLOGICAL: No headaches, memory loss, loss of strength.  MISCELLANEOUS:      Vital Signs Last 24 Hrs  T(C): 36.9 (11 Nov 2017 09:06), Max: 37.3 (10 Nov 2017 19:28)  T(F): 98.5 (11 Nov 2017 09:06), Max: 99.1 (10 Nov 2017 19:28)  HR: 79 (11 Nov 2017 09:06) (76 - 90)  BP: 101/64 (11 Nov 2017 09:06) (95/60 - 115/66)  BP(mean): --  RR: 16 (11 Nov 2017 09:06) (16 - 18)  SpO2: 93% (11 Nov 2017 09:06) (93% - 96%)    PHYSICAL EXAM:    GENERAL: NAD, well-groomed  HEENT: PERRL, +EOMI  NECK: soft, Supple, No JVD,   CHEST/LUNG: Clear to percussion bilaterally; No wheezing  HEART: S1S2+, Regular rate and rhythm; No murmurs, rubs, or gallops  ABDOMEN: Soft, Nontender, Nondistended; Bowel sounds present  EXTREMITIES:  2+ Peripheral Pulses, No clubbing, cyanosis, or edema  SKIN: No rashes or lesions  NEURO: AAOX3, no focal deficits, no motor r sensory loss  PSYCH: normal mood        LABS:                        10.7   11.0  )-----------( 180      ( 11 Nov 2017 06:49 )             33.4     11-11    138  |  100  |  11.0  ----------------------------<  104  4.3   |  29.0  |  0.56    Ca    8.7      11 Nov 2017 06:49              MEDICATIONS  (STANDING):  acetaminophen   Tablet. 650 milliGRAM(s) Oral every 6 hours  aspirin enteric coated 325 milliGRAM(s) Oral two times a day  celecoxib 200 milliGRAM(s) Oral two times a day with meals  docusate sodium 100 milliGRAM(s) Oral three times a day  oxyCODONE  ER Tablet 10 milliGRAM(s) Oral every 12 hours  polyethylene glycol 3350 17 Gram(s) Oral daily  sodium chloride 0.9% lock flush 3 milliLiter(s) IV Push every 8 hours  sodium chloride 0.9%. 1000 milliLiter(s) (100 mL/Hr) IV Continuous <Continuous>    MEDICATIONS  (PRN):  acetaminophen   Tablet 650 milliGRAM(s) Oral every 6 hours PRN For Temp over 38.3 C (100.94 F)  aluminum hydroxide/magnesium hydroxide/simethicone Suspension 30 milliLiter(s) Oral four times a day PRN Indigestion  bisacodyl Suppository 10 milliGRAM(s) Rectal daily PRN If no bowel movement by postoperative day #2  HYDROmorphone   Tablet 2 milliGRAM(s) Oral every 3 hours PRN Severe Pain (7 - 10)  HYDROmorphone  Injectable 0.5 milliGRAM(s) IV Push every 4 hours PRN breakthrough pain  magnesium hydroxide Suspension 30 milliLiter(s) Oral daily PRN Constipation  ondansetron Injectable 4 milliGRAM(s) IV Push every 6 hours PRN Nausea and/or Vomiting  oxyCODONE    IR 5 milliGRAM(s) Oral every 4 hours PRN Mild Pain  oxyCODONE    IR 10 milliGRAM(s) Oral every 4 hours PRN Moderate Pain  senna 2 Tablet(s) Oral at bedtime PRN Constipation      RADIOLOGY & ADDITIONAL TESTS: DILLON MOORE    [id number]    72y      Female    CC: /p  R TKA  , POD # 2  c/o nausea, poor appetite, feels tired.   Unable to eat much of braeakfast, although able to keep fluids down.    INTERVAL HPI/OVERNIGHT EVENTS: no acute events    REVIEW OF SYSTEMS:    CONSTITUTIONAL: No fever  RESPIRATORY: No cough,  No shortness of breath  GASTROINTESTINAL: No abdominal or epigastric pain. + nausea,no vomiting        Vital Signs Last 24 Hrs  T(C): 36.9 (11 Nov 2017 09:06), Max: 37.3 (10 Nov 2017 19:28)  T(F): 98.5 (11 Nov 2017 09:06), Max: 99.1 (10 Nov 2017 19:28)  HR: 79 (11 Nov 2017 09:06) (76 - 90)  BP: 101/64 (11 Nov 2017 09:06) (95/60 - 115/66)  BP(mean): --  RR: 16 (11 Nov 2017 09:06) (16 - 18)  SpO2: 93% (11 Nov 2017 09:06) (93% - 96%)    PHYSICAL EXAM:    GENERAL: NAD, well-groomed  HEENT: PERRL, +EOMI  NECK: soft, Supple, No JVD,   CHEST/LUNG: Clear to percussion bilaterally; No wheezing  HEART: S1S2+, Regular rate and rhythm; No murmurs, rubs, or gallops  ABDOMEN: Soft, Nontender, distended; Bowel sounds present  EXTREMITIES:  , No clubbing, cyanosis, or edema  NEURO: AAOX3      LABS:                        10.7   11.0  )-----------( 180      ( 11 Nov 2017 06:49 )             33.4     11-11    138  |  100  |  11.0  ----------------------------<  104  4.3   |  29.0  |  0.56    Ca    8.7      11 Nov 2017 06:49              MEDICATIONS  (STANDING):  acetaminophen   Tablet. 650 milliGRAM(s) Oral every 6 hours  aspirin enteric coated 325 milliGRAM(s) Oral two times a day  celecoxib 200 milliGRAM(s) Oral two times a day with meals  docusate sodium 100 milliGRAM(s) Oral three times a day  oxyCODONE  ER Tablet 10 milliGRAM(s) Oral every 12 hours  polyethylene glycol 3350 17 Gram(s) Oral daily  sodium chloride 0.9% lock flush 3 milliLiter(s) IV Push every 8 hours  sodium chloride 0.9%. 1000 milliLiter(s) (100 mL/Hr) IV Continuous <Continuous>    MEDICATIONS  (PRN):  acetaminophen   Tablet 650 milliGRAM(s) Oral every 6 hours PRN For Temp over 38.3 C (100.94 F)  aluminum hydroxide/magnesium hydroxide/simethicone Suspension 30 milliLiter(s) Oral four times a day PRN Indigestion  bisacodyl Suppository 10 milliGRAM(s) Rectal daily PRN If no bowel movement by postoperative day #2  HYDROmorphone   Tablet 2 milliGRAM(s) Oral every 3 hours PRN Severe Pain (7 - 10)  HYDROmorphone  Injectable 0.5 milliGRAM(s) IV Push every 4 hours PRN breakthrough pain  magnesium hydroxide Suspension 30 milliLiter(s) Oral daily PRN Constipation  ondansetron Injectable 4 milliGRAM(s) IV Push every 6 hours PRN Nausea and/or Vomiting  oxyCODONE    IR 5 milliGRAM(s) Oral every 4 hours PRN Mild Pain  oxyCODONE    IR 10 milliGRAM(s) Oral every 4 hours PRN Moderate Pain  senna 2 Tablet(s) Oral at bedtime PRN Constipation      RADIOLOGY & ADDITIONAL TESTS:

## 2017-11-11 NOTE — PROGRESS NOTE ADULT - SUBJECTIVE AND OBJECTIVE BOX
Patient seen and examined at bedside. comfortable in bed, pain controlled. Denies fever/chills, SOB/chest pain, abdominal pain, numbness/tingling. no complaints.    Vital Signs Last 24 Hrs  T(C): 36.6 (11 Nov 2017 04:35), Max: 37.3 (10 Nov 2017 19:28)  T(F): 97.8 (11 Nov 2017 04:35), Max: 99.1 (10 Nov 2017 19:28)  HR: 79 (11 Nov 2017 04:35) (76 - 90)  BP: 115/66 (11 Nov 2017 04:35) (95/60 - 115/66)  BP(mean): --  RR: 18 (11 Nov 2017 04:35) (18 - 18)  SpO2: 95% (11 Nov 2017 04:35) (95% - 96%)    RLE: Ace bandage dressing C/D/I. Prinelizabetho in tact. no erythema, no active drainage. sensation in tact to light touch. + dorsi/plantarflexion. DP 2+. Calf soft NT B/L.                          10.7   11.0  )-----------( 180      ( 11 Nov 2017 06:49 )             33.4     11-11    138  |  100  |  11.0  ----------------------------<  104  4.3   |  29.0  |  0.56    Ca    8.7      11 Nov 2017 06:49    A/P: 72 y.o F s/p right TKA POD #2  - dressing changed  - WBAT  - DVTP (ASA 325mg BID, SCDs)  - d/c planning - home today vs tomorrow when cleared by PT/medicine

## 2017-11-12 VITALS
HEART RATE: 87 BPM | OXYGEN SATURATION: 97 % | DIASTOLIC BLOOD PRESSURE: 66 MMHG | SYSTOLIC BLOOD PRESSURE: 98 MMHG | TEMPERATURE: 98 F | RESPIRATION RATE: 18 BRPM

## 2017-11-12 PROCEDURE — 88311 DECALCIFY TISSUE: CPT

## 2017-11-12 PROCEDURE — 99232 SBSQ HOSP IP/OBS MODERATE 35: CPT

## 2017-11-12 PROCEDURE — 85027 COMPLETE CBC AUTOMATED: CPT

## 2017-11-12 PROCEDURE — 80048 BASIC METABOLIC PNL TOTAL CA: CPT

## 2017-11-12 PROCEDURE — C1713: CPT

## 2017-11-12 PROCEDURE — 97163 PT EVAL HIGH COMPLEX 45 MIN: CPT

## 2017-11-12 PROCEDURE — 86901 BLOOD TYPING SEROLOGIC RH(D): CPT

## 2017-11-12 PROCEDURE — 97116 GAIT TRAINING THERAPY: CPT

## 2017-11-12 PROCEDURE — 36415 COLL VENOUS BLD VENIPUNCTURE: CPT

## 2017-11-12 PROCEDURE — 97110 THERAPEUTIC EXERCISES: CPT

## 2017-11-12 PROCEDURE — 86900 BLOOD TYPING SEROLOGIC ABO: CPT

## 2017-11-12 PROCEDURE — 86850 RBC ANTIBODY SCREEN: CPT

## 2017-11-12 PROCEDURE — 73560 X-RAY EXAM OF KNEE 1 OR 2: CPT

## 2017-11-12 PROCEDURE — 88305 TISSUE EXAM BY PATHOLOGIST: CPT

## 2017-11-12 PROCEDURE — C1776: CPT

## 2017-11-12 RX ADMIN — Medication 650 MILLIGRAM(S): at 12:34

## 2017-11-12 RX ADMIN — Medication 325 MILLIGRAM(S): at 16:03

## 2017-11-12 RX ADMIN — MAGNESIUM HYDROXIDE 30 MILLILITER(S): 400 TABLET, CHEWABLE ORAL at 08:25

## 2017-11-12 RX ADMIN — Medication 100 MILLIGRAM(S): at 12:34

## 2017-11-12 RX ADMIN — OXYCODONE HYDROCHLORIDE 10 MILLIGRAM(S): 5 TABLET ORAL at 09:00

## 2017-11-12 RX ADMIN — Medication 100 MILLIGRAM(S): at 05:18

## 2017-11-12 RX ADMIN — CELECOXIB 200 MILLIGRAM(S): 200 CAPSULE ORAL at 16:30

## 2017-11-12 RX ADMIN — OXYCODONE HYDROCHLORIDE 10 MILLIGRAM(S): 5 TABLET ORAL at 16:30

## 2017-11-12 RX ADMIN — Medication 325 MILLIGRAM(S): at 05:20

## 2017-11-12 RX ADMIN — OXYCODONE HYDROCHLORIDE 10 MILLIGRAM(S): 5 TABLET ORAL at 15:56

## 2017-11-12 RX ADMIN — OXYCODONE HYDROCHLORIDE 10 MILLIGRAM(S): 5 TABLET ORAL at 16:02

## 2017-11-12 RX ADMIN — POLYETHYLENE GLYCOL 3350 17 GRAM(S): 17 POWDER, FOR SOLUTION ORAL at 12:34

## 2017-11-12 RX ADMIN — OXYCODONE HYDROCHLORIDE 10 MILLIGRAM(S): 5 TABLET ORAL at 05:18

## 2017-11-12 RX ADMIN — CELECOXIB 200 MILLIGRAM(S): 200 CAPSULE ORAL at 16:03

## 2017-11-12 RX ADMIN — Medication 650 MILLIGRAM(S): at 13:00

## 2017-11-12 RX ADMIN — Medication 650 MILLIGRAM(S): at 06:51

## 2017-11-12 RX ADMIN — OXYCODONE HYDROCHLORIDE 10 MILLIGRAM(S): 5 TABLET ORAL at 06:51

## 2017-11-12 RX ADMIN — CELECOXIB 200 MILLIGRAM(S): 200 CAPSULE ORAL at 08:22

## 2017-11-12 RX ADMIN — OXYCODONE HYDROCHLORIDE 10 MILLIGRAM(S): 5 TABLET ORAL at 08:27

## 2017-11-12 RX ADMIN — CELECOXIB 200 MILLIGRAM(S): 200 CAPSULE ORAL at 09:00

## 2017-11-12 RX ADMIN — Medication 650 MILLIGRAM(S): at 05:18

## 2017-11-12 NOTE — PROGRESS NOTE ADULT - ASSESSMENT
72 year old female presents with a several year history of bilateral knee pain, right is worse than left.  Now s/p R TKA , POD # 3      Problem/Recommendation - 1:  Problem: Primary osteoarthritis of right knee. Recommendation: s/p Right TKA POD #3  Pain control.     Problem/Recommendation - 2:  ·  Problem: Status post right knee replacement.  Recommendation: Pain control.  PT eval.  Antibiotics, wound care and DVT prophylaxis as per Ortho.  Incentive Spirometry.  Avoid opioid induced constipation.   swelling - post operative - expected per ortho - ct to watch at home.      Problem/Recommendation - 3:  ·  Problem: Compression fracture of L2.  Recommendation: Pain control.  PT.     Problem / Plan - 4: Anemia - likely ABLA - asymptomatic , stable    Problem / Plan -5: Postoperative hypotensive episode - resolved with iv fluids.     Problem ; Post operative Nausea - resolved.   Medically stable for home.

## 2017-11-12 NOTE — PROGRESS NOTE ADULT - SUBJECTIVE AND OBJECTIVE BOX
DILLON MOORE    [id number]    History: 72y Female is status post right total knee arthroplasty on 11/9/2017 , POD # 03. Patient is doing well and is comfortable. The patient's pain is controlled using the prescribed pain medications. The patient is participating in physical therapy. Denies nausea, vomiting, chest pain, shortness of breath, abdominal pain or fever. No new complaints.      Vital Signs Last 24 Hrs  T(C): 36.7 (12 Nov 2017 09:30), Max: 36.9 (11 Nov 2017 16:16)  T(F): 98 (12 Nov 2017 09:30), Max: 98.4 (11 Nov 2017 16:16)  HR: 87 (12 Nov 2017 09:30) (78 - 87)  BP: 98/66 (12 Nov 2017 09:30) (98/66 - 124/75)  BP(mean): --  RR: 18 (12 Nov 2017 09:30) (17 - 20)  SpO2: 97% (12 Nov 2017 09:30) (94% - 97%)        Physical exam: The right knee dressing is clean, dry and intact. No drainage or discharge. No erythema is noted. No blistering. No ecchymosis. The calf is supple nontender. Passive range of motion is acceptable to due postoperative pain. No calf tenderness. Sensation to light touch is grossly intact distally. Motor function distally is 5/5. Extensor hallucis longus and flexor hallucis longus are intact. No foot drop. 2+ dorsalis pedis pulse. Capillary refill is less than 2 seconds. No cyanosis.    Primary Orthopedic Assessment:  • s/p RIGHT total knee replacement pod #3    Plan:   • DVT prophylaxis as prescribed, including use of compression devices and ankle pumps  • Continue physical therapy  • Weightbearing as tolerated of the right lower extremity with assistance of a walker  • Incentive spirometry encouraged  • Pain control as clinically indicated  • Discharge planning – anticipated discharge is Home today

## 2017-11-12 NOTE — PROGRESS NOTE ADULT - SUBJECTIVE AND OBJECTIVE BOX
DILLON MOORE    [id number]    72y      Female    CC: /p  R TKA  , POD # 3  nausea is better, poor appetite still  c/o RLE swelling.    INTERVAL HPI/OVERNIGHT EVENTS: no acute events        Vital Signs Last 24 Hrs  T(C): 36.7 (12 Nov 2017 09:30), Max: 36.9 (11 Nov 2017 16:16)  T(F): 98 (12 Nov 2017 09:30), Max: 98.4 (11 Nov 2017 16:16)  HR: 87 (12 Nov 2017 09:30) (78 - 87)  BP: 98/66 (12 Nov 2017 09:30) (98/66 - 124/75)  BP(mean): --  RR: 18 (12 Nov 2017 09:30) (17 - 20)  SpO2: 97% (12 Nov 2017 09:30) (94% - 97%)    PHYSICAL EXAM:    GENERAL: NAD, well-groomed  HEENT: PERRL, +EOMI  NECK: soft, Supple, No JVD,   EXTREMITIES: RLE edema +  SKIN: No rashes or lesions  NEURO: AAOX3  PSYCH: normal mood        LABS:                        10.7   11.0  )-----------( 180      ( 11 Nov 2017 06:49 )             33.4     11-11    138  |  100  |  11.0  ----------------------------<  104  4.3   |  29.0  |  0.56    Ca    8.7      11 Nov 2017 06:49              MEDICATIONS  (STANDING):  acetaminophen   Tablet. 650 milliGRAM(s) Oral every 6 hours  aspirin enteric coated 325 milliGRAM(s) Oral two times a day  celecoxib 200 milliGRAM(s) Oral two times a day with meals  docusate sodium 100 milliGRAM(s) Oral three times a day  oxyCODONE  ER Tablet 10 milliGRAM(s) Oral every 12 hours  polyethylene glycol 3350 17 Gram(s) Oral daily  sodium chloride 0.9% lock flush 3 milliLiter(s) IV Push every 8 hours  sodium chloride 0.9%. 1000 milliLiter(s) (100 mL/Hr) IV Continuous <Continuous>    MEDICATIONS  (PRN):  acetaminophen   Tablet 650 milliGRAM(s) Oral every 6 hours PRN For Temp over 38.3 C (100.94 F)  aluminum hydroxide/magnesium hydroxide/simethicone Suspension 30 milliLiter(s) Oral four times a day PRN Indigestion  bisacodyl Suppository 10 milliGRAM(s) Rectal daily PRN If no bowel movement by postoperative day #2  HYDROmorphone   Tablet 2 milliGRAM(s) Oral every 3 hours PRN Severe Pain (7 - 10)  HYDROmorphone  Injectable 0.5 milliGRAM(s) IV Push every 4 hours PRN breakthrough pain  magnesium hydroxide Suspension 30 milliLiter(s) Oral daily PRN Constipation  ondansetron    Tablet 4 milliGRAM(s) Oral three times a day PRN Nausea and/or Vomiting  ondansetron Injectable 4 milliGRAM(s) IV Push every 6 hours PRN Nausea and/or Vomiting  oxyCODONE    IR 5 milliGRAM(s) Oral every 4 hours PRN Mild Pain  oxyCODONE    IR 10 milliGRAM(s) Oral every 4 hours PRN Moderate Pain  senna 2 Tablet(s) Oral at bedtime PRN Constipation      RADIOLOGY & ADDITIONAL TESTS:

## 2017-11-15 LAB — SURGICAL PATHOLOGY FINAL REPORT - CH: SIGNIFICANT CHANGE UP

## 2017-11-24 ENCOUNTER — OTHER (OUTPATIENT)
Age: 72
End: 2017-11-24

## 2017-11-29 ENCOUNTER — OTHER (OUTPATIENT)
Age: 72
End: 2017-11-29

## 2017-12-01 ENCOUNTER — APPOINTMENT (OUTPATIENT)
Dept: ORTHOPEDIC SURGERY | Facility: CLINIC | Age: 72
End: 2017-12-01
Payer: MEDICARE

## 2017-12-01 VITALS
BODY MASS INDEX: 32.37 KG/M2 | HEIGHT: 63.5 IN | WEIGHT: 185 LBS | DIASTOLIC BLOOD PRESSURE: 86 MMHG | TEMPERATURE: 98.3 F | HEART RATE: 83 BPM | SYSTOLIC BLOOD PRESSURE: 128 MMHG

## 2017-12-01 PROCEDURE — 73562 X-RAY EXAM OF KNEE 3: CPT | Mod: LT

## 2017-12-01 PROCEDURE — 99024 POSTOP FOLLOW-UP VISIT: CPT

## 2017-12-01 RX ORDER — OXYCODONE 10 MG/1
10 TABLET ORAL
Qty: 42 | Refills: 0 | Status: ACTIVE | COMMUNITY
Start: 2017-11-11

## 2017-12-26 ENCOUNTER — APPOINTMENT (OUTPATIENT)
Dept: ORTHOPEDIC SURGERY | Facility: CLINIC | Age: 72
End: 2017-12-26
Payer: MEDICARE

## 2017-12-26 VITALS
SYSTOLIC BLOOD PRESSURE: 146 MMHG | BODY MASS INDEX: 32.37 KG/M2 | HEART RATE: 91 BPM | HEIGHT: 63.5 IN | WEIGHT: 185 LBS | DIASTOLIC BLOOD PRESSURE: 86 MMHG

## 2017-12-26 PROCEDURE — 99024 POSTOP FOLLOW-UP VISIT: CPT

## 2017-12-26 PROCEDURE — 73562 X-RAY EXAM OF KNEE 3: CPT | Mod: RT

## 2018-01-24 ENCOUNTER — OTHER (OUTPATIENT)
Age: 73
End: 2018-01-24

## 2018-01-26 ENCOUNTER — APPOINTMENT (OUTPATIENT)
Dept: ORTHOPEDIC SURGERY | Facility: CLINIC | Age: 73
End: 2018-01-26
Payer: MEDICARE

## 2018-01-26 VITALS
SYSTOLIC BLOOD PRESSURE: 131 MMHG | HEART RATE: 102 BPM | TEMPERATURE: 98 F | HEIGHT: 63.5 IN | WEIGHT: 185 LBS | DIASTOLIC BLOOD PRESSURE: 87 MMHG | BODY MASS INDEX: 32.37 KG/M2

## 2018-01-26 PROCEDURE — 99024 POSTOP FOLLOW-UP VISIT: CPT

## 2018-01-26 PROCEDURE — 73562 X-RAY EXAM OF KNEE 3: CPT | Mod: RT

## 2018-03-20 ENCOUNTER — OTHER (OUTPATIENT)
Age: 73
End: 2018-03-20

## 2018-05-02 ENCOUNTER — OTHER (OUTPATIENT)
Age: 73
End: 2018-05-02

## 2018-05-02 RX ORDER — AMOXICILLIN 500 MG/1
500 CAPSULE ORAL
Qty: 12 | Refills: 3 | Status: ACTIVE | COMMUNITY
Start: 2018-05-02 | End: 1900-01-01

## 2018-09-06 PROBLEM — M19.90 UNSPECIFIED OSTEOARTHRITIS, UNSPECIFIED SITE: Chronic | Status: ACTIVE | Noted: 2017-10-18

## 2018-09-06 PROBLEM — M25.569 PAIN IN UNSPECIFIED KNEE: Chronic | Status: ACTIVE | Noted: 2017-10-18

## 2018-09-06 PROBLEM — S32.020A WEDGE COMPRESSION FRACTURE OF SECOND LUMBAR VERTEBRA, INITIAL ENCOUNTER FOR CLOSED FRACTURE: Chronic | Status: ACTIVE | Noted: 2017-10-18

## 2018-09-24 ENCOUNTER — APPOINTMENT (OUTPATIENT)
Dept: ORTHOPEDIC SURGERY | Facility: CLINIC | Age: 73
End: 2018-09-24
Payer: MEDICARE

## 2018-09-24 VITALS
SYSTOLIC BLOOD PRESSURE: 134 MMHG | DIASTOLIC BLOOD PRESSURE: 81 MMHG | HEART RATE: 83 BPM | HEIGHT: 63.5 IN | BODY MASS INDEX: 32.37 KG/M2 | WEIGHT: 185 LBS

## 2018-09-24 PROCEDURE — 99213 OFFICE O/P EST LOW 20 MIN: CPT

## 2018-09-24 PROCEDURE — 73562 X-RAY EXAM OF KNEE 3: CPT | Mod: RT

## 2018-12-23 NOTE — CONSULT NOTE ADULT - I WAS PHYSICALLY PRESENT FOR THE KEY PORTIONS OF THE EVALUATION AND MANAGEMENT (E/M) SERVICE PROVIDED.  I AGREE WITH THE ABOVE HISTORY, PHYSICAL, AND PLAN WHICH I HAVE REVIEWED AND EDITED WHERE APPROPRIATE
Patient Education     When Your Child Has a Cold or Flu  Colds and influenza (flu) infect the upper respiratory tract. This includes the mouth, nose, nasal passages, and throat. Both illnesses are caused by germs called viruses, and both share some of the same symptoms. But colds and flu differ in a few key ways. Knowing more about these infections may make it easier to prevent them. And if your child does get sick, you can help keep symptoms from becoming worse.    What Is a Cold?  · Symptoms include runny nose, cough, sneezing, and sore throat. Cold symptoms tend to be milder than flu symptoms.  · Cold symptoms come on slowly.  · Children with a cold can still do most of their usual activities.  What Is the Flu?  · Influenza is a respiratory infection. (It’s not the same as the “stomach flu.”)  · Symptoms include fever, headache, tiredness, cough, sore throat, runny nose, and muscle aches. Children may also have an upset stomach and vomiting.  · Flu symptoms tend to come on quickly.  · Children with the flu may feel too worn out to engage in normal activities.  How Do Colds and Flu Spread?  The viruses that cause colds and flu spread in droplets when someone who is sick coughs or sneezes. Children can inhale the germs directly. But they can also  the virus by touching a surface where droplets have landed. Germs then enter a child’s body when she touches her eyes, nose, or mouth.  Why Do Children Get Colds and Flu?  Children get more colds and flu than adults do. Here are some reasons why:  · Less resistance: A child’s immune system is not as strong as an adult’s when it comes to fighting cold and flu germs.  · Winter season: Most respiratory illnesses occur in fall and winter when children are indoors and exposed to more germs.  · School or : Colds and flu spread easily when children are in close contact.  · Hand-to-mouth contact: Children are likely to touch their eyes, nose, or mouth without  washing their hands. This is the most common way germs spread.  How Are Colds and Flu Diagnosed?  Most often, doctors diagnose a cold or the flu based on the child’s symptoms and a physical exam. Children who are very sick may have throat or nasal swabs to check for bacteria and viruses. Your child’s doctor may perform other tests, depending on your child’s symptoms and overall health.  How Are Colds and Flu Treated?  Most children recover from colds and flu on their own. Antibiotics aren’t effective against viral infections, so they are not prescribed. Instead, treatment is focused on helping ease your child’s symptoms until the illness passes. To help your child feel better:  · Give your child lots of fluids, such as water, electrolyte solutions, apple juice, and warm soup, to prevent dehydration.  · Make sure your child gets plenty of rest.  · Have older children gargle with warm saltwater.  · To relieve nasal congestion, try saline nasal sprays. You can buy them without a prescription, and they’re safe for children. These are not the same as nasal decongestant sprays, which may make symptoms worse.  · Use “children’s strength” medication for symptoms. Discuss all over-the-counter (OTC) products with the doctor before using them. Note: Do not give OTC cough and cold medications to a child under 6 years unless the doctor tells you to do so.  · Never give aspirin to a child under age 18 who has a cold or flu. (It could cause a rare but serious condition called Reye’s syndrome.)  · Never give ibuprofen to an infant 6 months of age or younger.Keep your child home until he or she has been fever-free for 24 hours.  Preventing Colds and Flu  To help children stay healthy:  · Teach children to wash their hands often--before eating and after using the bathroom, playing with animals, or coughing or sneezing. Carry an alcohol-based hand gel (containing at least 60 percent alcohol) for times when soap and water aren’t  available.  · Remind children not to touch their eyes, nose, and mouth.  · Ask your child’s doctor about a flu vaccination for your child. Vaccination is recommended for all children 6 months and older. The vaccination is given in the form of a shot or a nasal spray.  Tips for Proper Handwashing  Use warm water and plenty of soap. Work up a good lather.  · Clean the whole hand, under the nails, between the fingers, and up the wrists.  · Wash for at least 15-20 seconds (as long as it takes to say the alphabet or sing “Happy Birthday”). Don’t just wipe--scrub well.  · Rinse well. Let the water run down the fingers, not up the wrists.  · In a public restroom, use a paper towel to turn off the faucet and open the door.  When to Call the Doctor  Call your child’s doctor if a child doesn’t get better or has:  · Shortness of breath or fast breathing.  · Thick yellow or green mucus that comes up with coughing.  · Worsening symptoms, especially after a period of improvement.  · Fever:  ¨ In an infant under 3 months old, a rectal temperature of 100.4°F (38.0°C) or higher  ¨ In a child 3 to 36 months, a rectal temperature of 102°F (39.0°C) or higher  ¨ In a child of any age who has a temperature of 103°F (39.4°C) or higher  ¨ A fever that lasts more than 24-hours in a child under 2 years old, or for 3 days in a child 2 years or older  ¨ Your child has had a seizure caused by the fever  · Fever with a rash, or fever that doesn’t go down with medication.  · Severe or continued vomiting.  · Signs of dehydration: a dry mouth; dark or strong-smelling urine or no urine output in 6-8 hours; refusal to drink fluids.  · Trouble waking up.  · Ear pain (in toddlers or adolescents).   © 6599-8452 [employer]. [address] [place] [address], [place] [address]. All rights reserved. This information is not intended as a substitute for professional medical care. Always follow your healthcare professional's instructions.            Statement Selected

## 2019-08-13 ENCOUNTER — TRANSCRIPTION ENCOUNTER (OUTPATIENT)
Age: 74
End: 2019-08-13

## 2019-11-27 ENCOUNTER — APPOINTMENT (OUTPATIENT)
Dept: ORTHOPEDIC SURGERY | Facility: CLINIC | Age: 74
End: 2019-11-27
Payer: MEDICARE

## 2019-11-27 VITALS
SYSTOLIC BLOOD PRESSURE: 119 MMHG | WEIGHT: 185 LBS | HEIGHT: 63.5 IN | BODY MASS INDEX: 32.37 KG/M2 | DIASTOLIC BLOOD PRESSURE: 73 MMHG | HEART RATE: 81 BPM

## 2019-11-27 PROCEDURE — 73562 X-RAY EXAM OF KNEE 3: CPT | Mod: RT

## 2019-11-27 PROCEDURE — 73502 X-RAY EXAM HIP UNI 2-3 VIEWS: CPT | Mod: RT

## 2019-11-27 PROCEDURE — 99213 OFFICE O/P EST LOW 20 MIN: CPT

## 2019-11-27 RX ORDER — AMOXICILLIN 500 MG/1
500 CAPSULE ORAL
Qty: 20 | Refills: 0 | Status: ACTIVE | COMMUNITY
Start: 2019-11-27 | End: 1900-01-01

## 2019-11-27 NOTE — PHYSICAL EXAM
[LE] : Sensory: Intact in bilateral lower extremities [ALL] : Biceps, brachioradialis, triceps, patellar, ankle and plantar 2+ and symmetric bilaterally [Normal] : Alert and in no acute distress [Obese] : obese [Antalgic] : not antalgic [Poor Appearance] : well-appearing [Acute Distress] : not in acute distress [de-identified] : GENERAL APPEARANCE: Well nourished and hydrated, pleasant, alert, and oriented x 3. Appears their stated age. \par HEENT: Normocephalic, extraocular eye motion intact. Nasal septum midline. Oral cavity clear. External auditory canal clear. \par RESPIRATORY: Breath sounds clear and audible in all lobes. No wheezing, No accessory muscle use.\par CARDIOVASCULAR: No apparent abnormalities. No lower leg edema. No varicosities. Pedal pulses are palpable.\par NEUROLOGIC: Sensation is normal, no muscle weakness in the upper or lower extremities.\par DERMATOLOGIC: No apparent skin lesions, moist, warm, no rash.\par SPINE: Cervical spine appears normal and moves freely; thoracic spine appears normal and moves freely; lumbosacral spine appears normal and moves freely, normal, nontender.\par MUSCULOSKELETAL: Hands, wrists, and elbows are normal and move freely, shoulders are normal and move freely.  [de-identified] : Right knee examination shows healed midline incision with active ROM of 0-120° range of motion without pain\par Right hip exam shows tenderness over the greater trochanteric bursa.  [de-identified] : 3V xray of the right knee done in office today and reviewed by Dr. Washington Ugarte demonstrates s/p TKR with implants in good positioning, no sign of wear, loosening or subsidence. \par 3V Xray of the right hip and pelvis done in office today and reviewed by Dr. Washington Ugarte demonstrates well-preserved joint without findings or any evidence of fracture, dislocation, or any other acute orthopedic pathology. There is no radiographic features of severe OA present. \par \par \par

## 2019-11-27 NOTE — HISTORY OF PRESENT ILLNESS
[de-identified] : Patient is a 74 year old female who is s/p right TKA in 11/9/17 who presents for follow up. patient denies any pain to right knee, states overall doing very well.  \par Discussed dental prophylaxis with patient\par Patient presents c/o posterior hip and low back pain - chronic pain, patient denies any medication or treatment for pain

## 2019-11-27 NOTE — ADDENDUM
[FreeTextEntry1] : I, Chano Silverio, acted solely as a scribe for Dr. Washington Ugarte on this date 11/27/2019.

## 2019-11-27 NOTE — REASON FOR VISIT
[Follow-Up Visit] : a follow-up visit for [FreeTextEntry2] : s/p Right total knee arthroplasty. DOS:11/9/17.

## 2019-11-27 NOTE — DISCUSSION/SUMMARY
[de-identified] : 74 year old female s/p right TKA from 11/9/17. She is doing well with regards to right knee. Xrays were reviewed and the patient was reassured that their TKA components are in good position with no signs of loosening or wear. I discussed the importance of antibiotic use with any dental work. \par She also has right hip greater trochanteric bursitis. The patient was reassured that right hip xrays showed no evidence of severe osteoarthritis. I offered cortisone injection for trochanteric bursitis but she defers. F/U five years from surgery for radiographic surveillance of right TKA implants.\par \par A knee replacement means resurfacing of all 3 surfaces of the patella, the femur, and tibia with metal and plastic parts. The prosthetic parts are usually cemented into position and well outpatient range of motion from full extension to about 120° of flexion. The postoperative motion, however, is determined by multiple factors, the most important of which is preoperative motion. In general, the better motion preoperatively, the better the motion postoperatively. The operation, pending medical clearance, gently requires hospitalization of 1 to 2 days for one knee, 2 to 4 days for bilateral knee replacements. In general, we prefer to perform the procedure under spinal anesthesia with femoral nerve block and occasional single shot sciatic nerve block. We may ask a patient to give 2 units of blood for bilateral total knee arthroplasties, for one knee we institute a normogram which may include administration of preoperative Procrit. The operative procedure takes probably 1-2 hours. The operation requires a straight incision anywhere from 5-7 inches down from the knee. Post-operatively the patient will be walking the day of surgery. The first couple days are very painful and the pain medication will alleviate, but not eliminate the pain. The patient must really push hard to get range of motion. Our goal for having a person go home as that the range of motion is approximately 0-90° of flexion and that they can walk with a walker or cane. A walking aid is to be dispensed once the patient is secure enough. In general there, there is no cast or brace required with routine knee replacement. In the long term, we do not encourage our patients to run for the sake of running, although pending their preoperative status, we often allow patient to play doubles tennis or comparative activities. We also allowed them to do gentle intermediate downhill skiing if they are truly an expert skier. Biking is encouraged as well swimming. The followup periods are usually 3 weeks, 6 weeks, 3 months, and yearly intervals. Potential complications with total knee replacement included anesthetic complications and death, infection around 1%, nerve damage, by which means peroneal nerve palsy, footdrop or flapping foot with ambulation. This is particularly more apt to occur in the patient with a valgus or knock-knee deformity. The incidence can be quite high in this particular patient population. There will be areas of skin numbness, but this is not an untoward effect nor do we consider it a complication. Other potential complications include dislocation of the patella component, usually less than 2%; loosening of the tibial or femoral component is much more infrequent. Most often this occurs with infection or long-term use. Patient of extreme risk including markedly overweight patient's may be more prone to prosthetic wear. Major blood vessel damage is also extremely rare. Directly because of the anatomic proximity of the popliteal artery this could be lacerated with subsequent repair required. Be it unlikely, disruption of the popliteal artery could theoretically result in amputation. Similarly, infection could theoretically result in amputation if one were to grow out of an organism that cannot be controlled with antibiotics. General medical complications include phlebitis, for which we would prophylactically anticoagulate patients, but could still occur, and fatal pulmonary embolus which has been reported. Cardiovascular problems, such as heart attack or ischemia are always a concern with such hemodynamic changes in the blood vascular system. Other General complications are rare, but anything medicine could theoretically happen. I think the patient understands the risk benefit ratio of total knee replacement and will think about whether there like to pursue with an operation or nonoperative treatment program. I reviewed the plan of care as well as a model of a total knee implant equivalent to the one that will be used for their total knee joint replacement. The patient agreed to the plan of care as well as the use of implants for their knee total joint replacement.

## 2021-01-13 NOTE — PATIENT PROFILE ADULT. - HEALTHCARE INFORMATION NEEDED, PROFILE
none offered Clindamycin Counseling: I counseled the patient regarding use of clindamycin as an antibiotic for prophylactic and/or therapeutic purposes. Clindamycin is active against numerous classes of bacteria, including skin bacteria. Side effects may include nausea, diarrhea, gastrointestinal upset, rash, hives, yeast infections, and in rare cases, colitis.

## 2022-12-28 ENCOUNTER — APPOINTMENT (OUTPATIENT)
Dept: ORTHOPEDIC SURGERY | Facility: CLINIC | Age: 77
End: 2022-12-28

## 2022-12-28 VITALS
HEART RATE: 56 BPM | WEIGHT: 170 LBS | SYSTOLIC BLOOD PRESSURE: 132 MMHG | BODY MASS INDEX: 29.75 KG/M2 | DIASTOLIC BLOOD PRESSURE: 80 MMHG | HEIGHT: 63.5 IN

## 2022-12-28 DIAGNOSIS — Z47.1 AFTERCARE FOLLOWING JOINT REPLACEMENT SURGERY: ICD-10-CM

## 2022-12-28 DIAGNOSIS — Z96.651 PRESENCE OF RIGHT ARTIFICIAL KNEE JOINT: ICD-10-CM

## 2022-12-28 DIAGNOSIS — Z96.651 AFTERCARE FOLLOWING JOINT REPLACEMENT SURGERY: ICD-10-CM

## 2022-12-28 PROCEDURE — 20610 DRAIN/INJ JOINT/BURSA W/O US: CPT | Mod: LT

## 2022-12-28 PROCEDURE — 99204 OFFICE O/P NEW MOD 45 MIN: CPT | Mod: 25

## 2022-12-28 PROCEDURE — 73562 X-RAY EXAM OF KNEE 3: CPT | Mod: 50

## 2022-12-28 PROCEDURE — 73502 X-RAY EXAM HIP UNI 2-3 VIEWS: CPT | Mod: LT

## 2022-12-28 NOTE — PROCEDURE
[de-identified] : I injected the patient's left hip bursa today with cortisone for greater trochanteric bursitis.\par \par I discussed at length with the patient the planned steroid and lidocaine injection. The risks, benefits, convalescence and alternatives were reviewed. The possible side effects discussed included but were not limited to: pain, swelling, heat, bleeding, and redness. Symptoms are generally mild but if they are extensive then contact the office. Giving pain relievers by mouth such as NSAIDs or Tylenol can generally treat the reactions to steroid and lidocaine. Rare cases of infection have been noted. Rash, hives and itching may occur post injection. If you have muscle pain or cramps, flushing and or swelling of the face, rapid heart beat, nausea, dizziness, fever, chills, headache, difficulty breathing, swelling in the arms or legs, or have a prickly feeling of your skin, contact a health care provider immediately. Following this discussion, the hip was prepped with Alcohol and under sterile condition the 80 mg Depo-Medrol and 6 cc Lidocaine injection was performed with a 20 gauge needle through a superolateral injection site. The needle was introduced into the joint, aspiration was performed to ensure intra-articular placement and the medication was injected. Upon withdrawal of the needle the site was cleaned with alcohol and a band aid applied. The patient tolerated the injection well and there were no adverse effects. Post injection instructions included no strenuous activity for 24 hours, cryotherapy and if there are any adverse effects to contact the office.

## 2022-12-28 NOTE — REVIEW OF SYSTEMS
[Joint Pain] : joint pain [Negative] : Heme/Lymph [Joint Stiffness] : joint stiffness [FreeTextEntry9] : left hip and bilateral knee

## 2022-12-28 NOTE — PHYSICAL EXAM
[LE] : Sensory: Intact in bilateral lower extremities [ALL] : dorsalis pedis, posterior tibial, femoral, popliteal, and radial 2+ and symmetric bilaterally [Antalgic] : not antalgic [de-identified] : GENERAL APPEARANCE: Well nourished and hydrated, pleasant, alert, and oriented x 3. Appears their stated age. \par HEENT: Normocephalic, extraocular eye motion intact. Nasal septum midline. Oral cavity clear. External auditory canal clear. \par RESPIRATORY: Breath sounds clear and audible in all lobes. No wheezing, No accessory muscle use.\par CARDIOVASCULAR: No apparent abnormalities. No lower leg edema. No varicosities. Pedal pulses are palpable.\par NEUROLOGIC: Sensation is normal, no muscle weakness in the upper or lower extremities.\par DERMATOLOGIC: No apparent skin lesions, moist, warm, no rash.\par SPINE: Cervical spine appears normal and moves freely; thoracic spine appears normal and moves freely; lumbosacral spine appears normal and moves freely, normal, nontender.\par MUSCULOSKELETAL: Hands, wrists, and elbows are normal and move freely, shoulders are normal and move freely.  [de-identified] : Right knee exam shows healed incision with no sign of infection. ROM 0-120 degree \par Left knee exam shows + crepitus, ROM is 0-120 degree mild joint line tenderness\par  left hip hip exam shows no groin pain with SLR, pain and tenderness in the greater trochanteric bursa.		  [de-identified] : 5V xray of the b/l knee done in the office today and reviewed by Dr. Washington Ugarte demonstrates s/p right knee implants in good positioning with no evidence of wear, loosening, or subsidence and left knee shows moderate medial compartmental osteoarthritis \par \par 3V xray of the left hip done in the office today and reviewed by Dr. Washington Ugarte demonstrates mild left hip osteoarthritis

## 2022-12-28 NOTE — END OF VISIT
[FreeTextEntry3] : I, Baldemar Maloney, acted solely as a scribe for Dr. Washington Ugarte on 12/28/2022

## 2022-12-28 NOTE — HISTORY OF PRESENT ILLNESS
[4] : a current pain level of 4/10 [Pain Location] : pain [Stable] : stable [6] : a maximum pain level of 6/10 [de-identified] : 76 y/o F pt presents with left hip pain and left knee pain.  She states her pain started in the beginning of fall. Her pain is throbbing and achy. She feels localized pain and stiffness on the lateral aspect of the hip. She denies any groin pain.  Her symptoms are worse when going up stairs and laying on her side .  She had no prior treatment on her left hip.\par Regarding the left knee, she had moderate intermittent pain The pt is also s/p right total knee arthroplasty. DOS:11/9/17. her right knee is doing well. no pain.

## 2022-12-28 NOTE — REASON FOR VISIT
[Initial Visit] : an initial visit for [Other: ____] : [unfilled] [FreeTextEntry2] : s/p Right total knee arthroplasty. DOS:11/9/17.

## 2022-12-28 NOTE — DISCUSSION/SUMMARY
[Medication Risks Reviewed] : Medication risks reviewed [de-identified] : 78 y/o F pt presents with mild left hip osteoarthritis. She is not a candidate for a left PIYUSH. We suspect her pain is related to left hip bursitis. We recommend conservative therapy options such as bursa cortisone injections and antiinflammatories. The pt opted for a left hip bursa injection which she tolerated well. She can receive injections every 3 months if needed. Regarding the left knee, she has moderate medial compartmental osteoarthritis. She is not  a candidate for a left TKA. She deferred on any cortisone injections at this time since her knee pain is intermittent and tolerable. She is also s/p right TKA from 2017. We reassured her that her implants are stable with no evidence of loosening or wear. She should continue to do low impact exercises.

## 2023-03-14 NOTE — H&P PST ADULT - NS SC CAGE ALCOHOL GUILTY ABOUT
[Dear  ___] : Dear  [unfilled], [Consult Letter:] : I had the pleasure of evaluating your patient, [unfilled]. [Please see my note below.] : Please see my note below. [Sincerely,] : Sincerely, [FreeTextEntry3] : Néstor Roberts MD\par Medical Oncology/Hematology\par [facility] [place], [facility]\par \par [facility]\par \par  no

## 2023-03-28 ENCOUNTER — APPOINTMENT (OUTPATIENT)
Dept: ORTHOPEDIC SURGERY | Facility: CLINIC | Age: 78
End: 2023-03-28
Payer: MEDICARE

## 2023-03-28 VITALS
HEIGHT: 63 IN | SYSTOLIC BLOOD PRESSURE: 107 MMHG | BODY MASS INDEX: 30.12 KG/M2 | HEART RATE: 71 BPM | DIASTOLIC BLOOD PRESSURE: 72 MMHG | WEIGHT: 170 LBS

## 2023-03-28 DIAGNOSIS — M17.12 UNILATERAL PRIMARY OSTEOARTHRITIS, LEFT KNEE: ICD-10-CM

## 2023-03-28 PROCEDURE — 99214 OFFICE O/P EST MOD 30 MIN: CPT | Mod: 25

## 2023-03-28 PROCEDURE — 20610 DRAIN/INJ JOINT/BURSA W/O US: CPT | Mod: LT

## 2023-03-28 NOTE — PHYSICAL EXAM
[LE] : Sensory: Intact in bilateral lower extremities [ALL] : dorsalis pedis, posterior tibial, femoral, popliteal, and radial 2+ and symmetric bilaterally [Antalgic] : not antalgic [de-identified] : GENERAL APPEARANCE: Well nourished and hydrated, pleasant, alert, and oriented x 3. Appears their stated age. \par HEENT: Normocephalic, extraocular eye motion intact. Nasal septum midline. Oral cavity clear. External auditory canal clear. \par RESPIRATORY: Breath sounds clear and audible in all lobes. No wheezing, No accessory muscle use.\par CARDIOVASCULAR: No apparent abnormalities. No lower leg edema. No varicosities. Pedal pulses are palpable.\par NEUROLOGIC: Sensation is normal, no muscle weakness in the upper or lower extremities.\par DERMATOLOGIC: No apparent skin lesions, moist, warm, no rash.\par SPINE: Cervical spine appears normal and moves freely; thoracic spine appears normal and moves freely; lumbosacral spine appears normal and moves freely, normal, nontender.\par MUSCULOSKELETAL: Hands, wrists, and elbows are normal and move freely, shoulders are normal and move freely.  [de-identified] : Right knee exam shows healed incision with no sign of infection. ROM 0-120 degree \par Left knee exam shows + crepitus, ROM is 0-120 degree mild joint line tenderness\par  left hip hip exam shows no groin pain with SLR, pain and tenderness in the greater trochanteric bursa.

## 2023-03-28 NOTE — HISTORY OF PRESENT ILLNESS
[Pain Location] : pain [Stable] : stable [6] : a current pain level of 6/10 [8] : a maximum pain level of 8/10 [de-identified] : 77 y/o F pt presents with left hip pain and left knee pain.  She states her pain started in the beginning of fall. Her pain is throbbing and achy. She feels localized pain and stiffness on the lateral aspect of the hip. she also reports a crossing her lower back to the right hip side.  she denies any groin pain.  Her symptoms are worse when going up stairs and laying on her side .  She had cortisone injection at last visit which provided about 6 weeks of pain relief\par Regarding the left knee, she had moderate intermittent pain the pain is tolerable\par  The pt is also s/p right total knee arthroplasty. DOS:11/9/17. her right knee is doing well. no pain.

## 2023-03-28 NOTE — DISCUSSION/SUMMARY
[de-identified] : Medication risks reviewed. 77 y/o F pt presents with mild left hip osteoarthritis. She is not a candidate for a left PIYUSH. We suspect her pain is related to left hip bursitis. We recommend conservative therapy options such as bursa cortisone injections and antiinflammatories.   Her prior bursa injection provided 6 weeks relief The pt opted for a left hip bursa injection again and I provided 120 mg today. she tolerated well. She can receive injections every 3 months if needed. Regarding the left knee, she has moderate medial compartmental osteoarthritis. She is not a candidate for a left TKA. She deferred on any cortisone injections at this time since her knee pain is intermittent and tolerable. She is also s/p right TKA from 2017.  She is doing well with the right knee. She should continue to do low impact exercises. \par

## 2023-03-28 NOTE — PROCEDURE
[de-identified] : Pt received left hip bursa injection with cortisone for hip bursitis \par \par I discussed at length with the patient the planned steroid and lidocaine injection for hip bursitis. The risks, benefits, convalescence and alternatives were reviewed and pt consented. The possible side effects discussed included but were not limited to: pain, swelling, heat, bleeding, and redness. Symptoms are generally mild but if they are extensive then contact the office. Giving pain relievers by mouth such as NSAIDs or Tylenol can generally treat the reactions to steroid and lidocaine. Rare cases of infection have been noted. Rash, hives and itching may occur post injection. If you have muscle pain or cramps, flushing and or swelling of the face, rapid heart beat, nausea, dizziness, fever, chills, headache, difficulty breathing, swelling in the arms or legs, or have a prickly feeling of your skin, contact a health care provider immediately. Following this discussion, the hip was prepped with Alcohol and under sterile condition the 120 mg Depo-Medrol and 6 cc Lidocaine injection was performed with a spinal needle through a greater trochanter bursa injection site. The needle was introduced into the bursa  and the medication was injected. Upon withdrawal of the needle the site was cleaned with alcohol and a band aid applied. The patient tolerated the injection well and there were no adverse effects. Post injection instructions included no strenuous activity for 24 hours, cryotherapy and if there are any adverse effects to contact the office.

## 2023-06-20 ENCOUNTER — APPOINTMENT (OUTPATIENT)
Dept: ORTHOPEDIC SURGERY | Facility: CLINIC | Age: 78
End: 2023-06-20
Payer: MEDICARE

## 2023-06-20 VITALS
HEIGHT: 63 IN | DIASTOLIC BLOOD PRESSURE: 81 MMHG | WEIGHT: 170 LBS | SYSTOLIC BLOOD PRESSURE: 128 MMHG | BODY MASS INDEX: 30.12 KG/M2

## 2023-06-20 PROCEDURE — 99214 OFFICE O/P EST MOD 30 MIN: CPT | Mod: 25

## 2023-06-20 PROCEDURE — 20610 DRAIN/INJ JOINT/BURSA W/O US: CPT | Mod: RT

## 2023-06-20 NOTE — DISCUSSION/SUMMARY
[de-identified] : Medication risks reviewed. 77 y/o F pt presents with mild b/l hip osteoarthritis and symtomatic right hip bursitis. she had hx of left hip pain and bursa injection resolved the pain.  She is not a candidate for a PIYUSH. W We recommend conservative therapy options such as bursa cortisone injections and antiinflammatories.   Her prior bursa injection provided good relief The pt opted for a right hip bursa injection again and I provided 120 mg of mepomedrol today. she tolerated well. She can receive injections every 3 months if needed. Regarding the left knee, she has moderate medial compartmental osteoarthritis. amd sje osm pt c/o left knee pain today. She is not a candidate for a left TKA. She deferred on any cortisone injections at this time since her knee pain is intermittent and tolerable. She is also s/p right TKA from 2017.  She is doing well with the right knee. She should continue to do low impact exercises. \par

## 2023-06-20 NOTE — PROCEDURE
[de-identified] : Pt received right hip bursa injection with cortisone for hip bursitis \par \par I discussed at length with the patient the planned steroid and lidocaine injection for hip bursitis. The risks, benefits, convalescence and alternatives were reviewed and pt consented. The possible side effects discussed included but were not limited to: pain, swelling, heat, bleeding, and redness. Symptoms are generally mild but if they are extensive then contact the office. Giving pain relievers by mouth such as NSAIDs or Tylenol can generally treat the reactions to steroid and lidocaine. Rare cases of infection have been noted. Rash, hives and itching may occur post injection. If you have muscle pain or cramps, flushing and or swelling of the face, rapid heart beat, nausea, dizziness, fever, chills, headache, difficulty breathing, swelling in the arms or legs, or have a prickly feeling of your skin, contact a health care provider immediately. Following this discussion, the hip was prepped with Alcohol and under sterile condition the 120 mg Depo-Medrol and 6 cc Lidocaine injection was performed with a spinal needle through a greater trochanter bursa injection site. The needle was introduced into the bursa  and the medication was injected. Upon withdrawal of the needle the site was cleaned with alcohol and a band aid applied. The patient tolerated the injection well and there were no adverse effects. Post injection instructions included no strenuous activity for 24 hours, cryotherapy and if there are any adverse effects to contact the office.

## 2023-06-20 NOTE — PHYSICAL EXAM
[LE] : Sensory: Intact in bilateral lower extremities [ALL] : dorsalis pedis, posterior tibial, femoral, popliteal, and radial 2+ and symmetric bilaterally [Antalgic] : not antalgic [de-identified] : GENERAL APPEARANCE: Well nourished and hydrated, pleasant, alert, and oriented x 3. Appears their stated age. \par HEENT: Normocephalic, extraocular eye motion intact. Nasal septum midline. Oral cavity clear. External auditory canal clear. \par RESPIRATORY: Breath sounds clear and audible in all lobes. No wheezing, No accessory muscle use.\par CARDIOVASCULAR: No apparent abnormalities. No lower leg edema. No varicosities. Pedal pulses are palpable.\par NEUROLOGIC: Sensation is normal, no muscle weakness in the upper or lower extremities.\par DERMATOLOGIC: No apparent skin lesions, moist, warm, no rash.\par SPINE: Cervical spine appears normal and moves freely; thoracic spine appears normal and moves freely; lumbosacral spine appears normal and moves freely, normal, nontender.\par MUSCULOSKELETAL: Hands, wrists, and elbows are normal and move freely, shoulders are normal and move freely.  [de-identified] : right hip hip exam shows no groin pain with SLR, pain and tenderness in the greater trochanteric bursa.

## 2023-06-20 NOTE — HISTORY OF PRESENT ILLNESS
[Pain Location] : pain [6] : a current pain level of 6/10 [de-identified] : 77 y/o F pt presents with right hip pain. Her pain is throbbing and achy. She feels localized pain and stiffness on the lateral aspect of the hip. she also reports a crossing her lower back to the right hip side.  she denies any groin pain.  Her symptoms are worse when going up stairs and laying on her side .  She had cortisone injection for lefthip bursitis  at last visit which provided about 12 weeks of pain relief with 120mg, she had 6 weeks relief with 80mg \par Regarding the left knee, she had moderate intermittent pain the pain is tolerable\par  The pt is also s/p right total knee arthroplasty. DOS:11/9/17. her right knee is doing well. no pain.

## 2023-08-31 ENCOUNTER — OFFICE (OUTPATIENT)
Dept: URBAN - METROPOLITAN AREA CLINIC 104 | Facility: CLINIC | Age: 78
Setting detail: OPHTHALMOLOGY
End: 2023-08-31
Payer: MEDICARE

## 2023-08-31 DIAGNOSIS — H01.001: ICD-10-CM

## 2023-08-31 DIAGNOSIS — H01.005: ICD-10-CM

## 2023-08-31 DIAGNOSIS — H52.4: ICD-10-CM

## 2023-08-31 DIAGNOSIS — H25.13: ICD-10-CM

## 2023-08-31 DIAGNOSIS — H43.813: ICD-10-CM

## 2023-08-31 DIAGNOSIS — H01.002: ICD-10-CM

## 2023-08-31 DIAGNOSIS — H01.004: ICD-10-CM

## 2023-08-31 PROCEDURE — 92014 COMPRE OPH EXAM EST PT 1/>: CPT | Performed by: OPTOMETRIST

## 2023-08-31 PROCEDURE — 92015 DETERMINE REFRACTIVE STATE: CPT | Performed by: OPTOMETRIST

## 2023-08-31 ASSESSMENT — REFRACTION_CURRENTRX
OS_SPHERE: -0.75
OS_VPRISM_DIRECTION: PROGS
OD_SPHERE: PLANO
OD_OVR_VA: 20/
OS_ADD: +2.00
OS_AXIS: 048
OD_AXIS: 149
OS_OVR_VA: 20/
OD_VPRISM_DIRECTION: PROGS
OD_ADD: +2.00
OD_CYLINDER: -0.50
OS_CYLINDER: -0.50

## 2023-08-31 ASSESSMENT — SPHEQUIV_DERIVED
OS_SPHEQUIV: -2
OD_SPHEQUIV: -0.875
OS_SPHEQUIV: -1.875
OD_SPHEQUIV: -1

## 2023-08-31 ASSESSMENT — REFRACTION_MANIFEST
OS_CYLINDER: -0.75
OD_CYLINDER: -0.50
OU_VA: 20/30
OD_ADD: +2.50
OD_AXIS: 145
OS_AXIS: 040
OS_ADD: +2.50
OD_SPHERE: -0.75
OS_SPHERE: -1.50
OD_VA1: 20/30-
OS_VA1: 20/30

## 2023-08-31 ASSESSMENT — LID EXAM ASSESSMENTS
OD_BLEPHARITIS: RLL RUL 1+ 2+
OS_BLEPHARITIS: LLL LUL 1+ 2+

## 2023-08-31 ASSESSMENT — CONFRONTATIONAL VISUAL FIELD TEST (CVF)
OD_FINDINGS: FULL
OS_FINDINGS: FULL

## 2023-08-31 ASSESSMENT — REFRACTION_AUTOREFRACTION
OD_SPHERE: -0.25
OD_AXIS: 140
OS_CYLINDER: -1.00
OD_CYLINDER: -1.25
OS_SPHERE: -1.50
OS_AXIS: 043

## 2023-08-31 ASSESSMENT — VISUAL ACUITY
OS_BCVA: 20/40-2
OD_BCVA: 20/60-2

## 2023-08-31 ASSESSMENT — TONOMETRY
OS_IOP_MMHG: 20
OD_IOP_MMHG: 20

## 2023-09-15 ASSESSMENT — KOOS JR
RISING FROM SITTING: MILD
BENDING TO THE FLOOR TO PICK UP OBJECT: SEVERE
IMPORTED LATERALITY: RIGHT
KOOS JR RAW SCORE: 14
GOING UP OR DOWN STAIRS: MODERATE
IMPORTED KOOS JR SCORE: 63.78
GOING UP OR DOWN STAIRS: SEVERE
TWISING OR PIVOTING ON KNEE: EXTREME
IMPORTED FORM: YES
HOW SEVERE IS YOUR KNEE STIFFNESS AFTER FIRST WAKING IN MORNING: MILD
TWISING OR PIVOTING ON KNEE: MODERATE
KOOS JR RAW SCORE: 3
GOING UP OR DOWN STAIRS: SEVERE
BENDING TO THE FLOOR TO PICK UP OBJECT: SEVERE
RISING FROM SITTING: MILD
RISING FROM SITTING: MODERATE
IMPORTED FORM: YES
TWISING OR PIVOTING ON KNEE: MODERATE
GOING UP OR DOWN STAIRS: MODERATE
IMPORTED LATERALITY: RIGHT
KOOS JR RAW SCORE: 9
IMPORTED KOOS JR SCORE: 79.91
IMPORTED KOOS JR SCORE: 65.99
KOOS JR RAW SCORE: 8
IMPORTED KOOS JR SCORE: 65.99
STANDING UPRIGHT: MILD
HOW SEVERE IS YOUR KNEE STIFFNESS AFTER FIRST WAKING IN MORNING: MODERATE
RISING FROM SITTING: MODERATE
STANDING UPRIGHT: MILD
IMPORTED KOOS JR SCORE: 52.47
BENDING TO THE FLOOR TO PICK UP OBJECT: MODERATE
IMPORTED KOOS JR SCORE: 63.78
IMPORTED KOOS JR SCORE: 79.91
IMPORTED KOOS JR SCORE: 52.47
KOOS JR RAW SCORE: 8
TWISING OR PIVOTING ON KNEE: MILD
KOOS JR RAW SCORE: 3
KOOS JR RAW SCORE: 9
HOW SEVERE IS YOUR KNEE STIFFNESS AFTER FIRST WAKING IN MORNING: MODERATE
BENDING TO THE FLOOR TO PICK UP OBJECT: MODERATE
TWISING OR PIVOTING ON KNEE: MILD
KOOS JR RAW SCORE: 14
TWISING OR PIVOTING ON KNEE: EXTREME
HOW SEVERE IS YOUR KNEE STIFFNESS AFTER FIRST WAKING IN MORNING: MILD

## 2023-09-19 ENCOUNTER — APPOINTMENT (OUTPATIENT)
Dept: ORTHOPEDIC SURGERY | Facility: CLINIC | Age: 78
End: 2023-09-19
Payer: MEDICARE

## 2023-09-19 VITALS
BODY MASS INDEX: 30.12 KG/M2 | DIASTOLIC BLOOD PRESSURE: 80 MMHG | SYSTOLIC BLOOD PRESSURE: 134 MMHG | WEIGHT: 170 LBS | HEART RATE: 80 BPM | HEIGHT: 63 IN

## 2023-09-19 PROCEDURE — 99213 OFFICE O/P EST LOW 20 MIN: CPT | Mod: 25

## 2023-09-19 PROCEDURE — 20610 DRAIN/INJ JOINT/BURSA W/O US: CPT | Mod: RT

## 2023-12-19 ENCOUNTER — APPOINTMENT (OUTPATIENT)
Dept: ORTHOPEDIC SURGERY | Facility: CLINIC | Age: 78
End: 2023-12-19
Payer: MEDICARE

## 2023-12-19 VITALS
BODY MASS INDEX: 31.54 KG/M2 | WEIGHT: 178 LBS | DIASTOLIC BLOOD PRESSURE: 81 MMHG | SYSTOLIC BLOOD PRESSURE: 131 MMHG | HEIGHT: 63 IN | HEART RATE: 93 BPM

## 2023-12-19 DIAGNOSIS — M70.72 OTHER BURSITIS OF HIP, LEFT HIP: ICD-10-CM

## 2023-12-19 DIAGNOSIS — M16.12 UNILATERAL PRIMARY OSTEOARTHRITIS, LEFT HIP: ICD-10-CM

## 2023-12-19 PROCEDURE — 20610 DRAIN/INJ JOINT/BURSA W/O US: CPT | Mod: RT

## 2023-12-19 PROCEDURE — 99213 OFFICE O/P EST LOW 20 MIN: CPT | Mod: 25

## 2023-12-19 NOTE — PROCEDURE
[de-identified] : Pt received right hip bursa injection with cortisone for hip bursitis   I discussed at length with the patient the planned steroid and lidocaine injection for hip bursitis. The risks, benefits, convalescence and alternatives were reviewed and pt consented. The possible side effects discussed included but were not limited to: pain, swelling, heat, bleeding, and redness. Symptoms are generally mild but if they are extensive then contact the office. Giving pain relievers by mouth such as NSAIDs or Tylenol can generally treat the reactions to steroid and lidocaine. Rare cases of infection have been noted. Rash, hives and itching may occur post injection. If you have muscle pain or cramps, flushing and or swelling of the face, rapid heart beat, nausea, dizziness, fever, chills, headache, difficulty breathing, swelling in the arms or legs, or have a prickly feeling of your skin, contact a health care provider immediately. Following this discussion, the hip was prepped with Alcohol and under sterile condition the 80mg Depo-Medrol and 6 cc Lidocaine injection was performed with a spinal needle through a greater trochanter bursa injection site. The needle was introduced into the bursa  and the medication was injected. Upon withdrawal of the needle the site was cleaned with alcohol and a band aid applied. The patient tolerated the injection well and there were no adverse effects. Post injection instructions included no strenuous activity for 24 hours, cryotherapy and if there are any adverse effects to contact the office.

## 2023-12-19 NOTE — DISCUSSION/SUMMARY
[de-identified] : Medication risks reviewed. 77 y/o F pt presents with mild b/l hip osteoarthritis and symtomatic right hip bursitis. she had hx of left hip pain and bursa injection resolved the pain. She is not a candidate for a PIYUSH.    Her prior bursa injection provided good relief The pt opted for a right hip bursa injection again and I provided 80mg of depomedrol into right hip bursa today. she tolerated well. I suggested getting a right hip MRI if her pain gets worse. She can receive injections every 3 months if needed. Regarding the left knee, she has moderate medial compartmental osteoarthritis.   She is not a candidate for a left TKA. She deferred on any cortisone injections at this time since her knee pain is intermittent and tolerable. She is also s/p right TKA from 2017.  She is doing well with the right knee. She should continue to do low impact exercises.

## 2023-12-19 NOTE — HISTORY OF PRESENT ILLNESS
[de-identified] : 77 y/o F pt presents with right hip pain. Her pain is throbbing and achy. She feels localized pain and stiffness on the lateral aspect of the hip. she also reports a crossing her lower back to the right hip side.  she denies any groin pain.  Her symptoms are worse when going up stairs and laying on her side .  She had cortisone injection for left hip bursitis  at last visit which provided about 12 weeks of pain relief with 120mg, she had 6 weeks relief with 80mg . she denies left hip pain today.  Regarding the left knee, she had moderate intermittent pain the pain is tolerable.  The pt is also s/p right total knee arthroplasty. DOS:11/9/17. her right knee is doing well. no pain.  [Pain Location] : pain [Stable] : stable [5] : a current pain level of 5/10

## 2023-12-19 NOTE — PHYSICAL EXAM
[Antalgic] : not antalgic [LE] : Sensory: Intact in bilateral lower extremities [ALL] : dorsalis pedis, posterior tibial, femoral, popliteal, and radial 2+ and symmetric bilaterally [de-identified] : GENERAL APPEARANCE: Well nourished and hydrated, pleasant, alert, and oriented x 3. Appears their stated age. \par  HEENT: Normocephalic, extraocular eye motion intact. Nasal septum midline. Oral cavity clear. External auditory canal clear. \par  RESPIRATORY: Breath sounds clear and audible in all lobes. No wheezing, No accessory muscle use.\par  CARDIOVASCULAR: No apparent abnormalities. No lower leg edema. No varicosities. Pedal pulses are palpable.\par  NEUROLOGIC: Sensation is normal, no muscle weakness in the upper or lower extremities.\par  DERMATOLOGIC: No apparent skin lesions, moist, warm, no rash.\par  SPINE: Cervical spine appears normal and moves freely; thoracic spine appears normal and moves freely; lumbosacral spine appears normal and moves freely, normal, nontender.\par  MUSCULOSKELETAL: Hands, wrists, and elbows are normal and move freely, shoulders are normal and move freely.  [de-identified] : right hip hip exam shows no groin pain with SLR, pain and tenderness in the greater trochanteric bursa.

## 2024-03-19 ENCOUNTER — APPOINTMENT (OUTPATIENT)
Dept: ORTHOPEDIC SURGERY | Facility: CLINIC | Age: 79
End: 2024-03-19
Payer: MEDICARE

## 2024-03-19 VITALS
BODY MASS INDEX: 31.54 KG/M2 | SYSTOLIC BLOOD PRESSURE: 120 MMHG | DIASTOLIC BLOOD PRESSURE: 72 MMHG | WEIGHT: 178 LBS | HEART RATE: 76 BPM | HEIGHT: 63 IN

## 2024-03-19 DIAGNOSIS — M70.61 TROCHANTERIC BURSITIS, RIGHT HIP: ICD-10-CM

## 2024-03-19 DIAGNOSIS — M16.11 UNILATERAL PRIMARY OSTEOARTHRITIS, RIGHT HIP: ICD-10-CM

## 2024-03-19 DIAGNOSIS — G89.29 LOW BACK PAIN, UNSPECIFIED: ICD-10-CM

## 2024-03-19 DIAGNOSIS — M54.50 LOW BACK PAIN, UNSPECIFIED: ICD-10-CM

## 2024-03-19 PROCEDURE — 20610 DRAIN/INJ JOINT/BURSA W/O US: CPT | Mod: RT

## 2024-03-19 PROCEDURE — 99214 OFFICE O/P EST MOD 30 MIN: CPT | Mod: 25

## 2024-03-19 NOTE — PHYSICAL EXAM
[Antalgic] : not antalgic [LE] : Sensory: Intact in bilateral lower extremities [ALL] : dorsalis pedis, posterior tibial, femoral, popliteal, and radial 2+ and symmetric bilaterally [de-identified] : GENERAL APPEARANCE: Well nourished and hydrated, pleasant, alert, and oriented x 3. Appears their stated age. \par  HEENT: Normocephalic, extraocular eye motion intact. Nasal septum midline. Oral cavity clear. External auditory canal clear. \par  RESPIRATORY: Breath sounds clear and audible in all lobes. No wheezing, No accessory muscle use.\par  CARDIOVASCULAR: No apparent abnormalities. No lower leg edema. No varicosities. Pedal pulses are palpable.\par  NEUROLOGIC: Sensation is normal, no muscle weakness in the upper or lower extremities.\par  DERMATOLOGIC: No apparent skin lesions, moist, warm, no rash.\par  SPINE: Cervical spine appears normal and moves freely; thoracic spine appears normal and moves freely; lumbosacral spine appears normal and moves freely, normal, nontender.\par  MUSCULOSKELETAL: Hands, wrists, and elbows are normal and move freely, shoulders are normal and move freely.  [de-identified] : right hip hip exam shows no groin pain with SLR, pain and tenderness in the greater trochanteric bursa.

## 2024-03-19 NOTE — PROCEDURE
[de-identified] : Pt received right hip bursa injection with cortisone for hip bursitis   I discussed at length with the patient the planned steroid and lidocaine injection for hip bursitis. The risks, benefits, convalescence and alternatives were reviewed and pt consented. The possible side effects discussed included but were not limited to: pain, swelling, heat, bleeding, and redness. Symptoms are generally mild but if they are extensive then contact the office. Giving pain relievers by mouth such as NSAIDs or Tylenol can generally treat the reactions to steroid and lidocaine. Rare cases of infection have been noted. Rash, hives and itching may occur post injection. If you have muscle pain or cramps, flushing and or swelling of the face, rapid heart beat, nausea, dizziness, fever, chills, headache, difficulty breathing, swelling in the arms or legs, or have a prickly feeling of your skin, contact a health care provider immediately. Following this discussion, the hip was prepped with Alcohol and under sterile condition the 80mg Depo-Medrol and 6 cc Lidocaine injection was performed with a spinal needle through a greater trochanter bursa injection site. The needle was introduced into the bursa  and the medication was injected. Upon withdrawal of the needle the site was cleaned with alcohol and a band aid applied. The patient tolerated the injection well and there were no adverse effects. Post injection instructions included no strenuous activity for 24 hours, cryotherapy and if there are any adverse effects to contact the office.

## 2024-03-19 NOTE — HISTORY OF PRESENT ILLNESS
[Pain Location] : pain [de-identified] : 80 y/o F pt presents with right hip pain. Her pain is throbbing and achy. She feels localized pain and stiffness on the lateral aspect of the hip. she also reports a crossing her lower back to the right hip side.  she denies any groin pain.  Her symptoms are worse when going up stairs and laying on her side .  She had cortisone injection for left hip bursitis  at last visit which provided about 12 weeks of pain relief with 120mg, she had 6 weeks relief with 80mg . she denies left hip pain today.  pt state last right hip injection did not help. the hip pain worse with initiation of motion after immobilization  she also c/o chronic low back pain.   Regarding the left knee, she had moderate intermittent pain the pain is tolerable.  The pt is also s/p right total knee arthroplasty. DOS:11/9/17. her right knee is doing well. no pain.  [Stable] : stable [7] : a current pain level of 7/10

## 2024-03-19 NOTE — DISCUSSION/SUMMARY
[de-identified] : Medication risks reviewed. 80 y/o F pt presents with mild b/l hip osteoarthritis and symtomatic right hip bursitis. she had hx of left hip pain and bursa injection resolved the pain. She is not a candidate for a PIYUSH.    Her prior bursa injection provided good relief  but last one did not work. The pt opted for repeat right hip bursa injection in hope of relief. I advised pt to begin PT for right hip and thigh pain.  I refer pt to see dr salgado, pain managment for chronic back pain.  She can receive injections every 3 months if needed. Regarding the left knee, she has moderate medial compartmental osteoarthritis. I may optain right hip MRI if her condition worsen.   regards to her left knee. she has  moderate intermittent pain the pain is tolerable. pt is not a candidate for a left TKA. She deferred on any cortisone injections at this time since her knee pain is intermittent and tolerable. She is also s/p right TKA from 2017.  She is doing well with the right knee. She should continue to do low impact exercises.

## 2024-06-25 ENCOUNTER — APPOINTMENT (OUTPATIENT)
Dept: ORTHOPEDIC SURGERY | Facility: CLINIC | Age: 79
End: 2024-06-25
Payer: MEDICARE

## 2024-06-25 VITALS
WEIGHT: 178 LBS | BODY MASS INDEX: 31.54 KG/M2 | HEIGHT: 63 IN | SYSTOLIC BLOOD PRESSURE: 122 MMHG | DIASTOLIC BLOOD PRESSURE: 80 MMHG | HEART RATE: 68 BPM

## 2024-06-25 DIAGNOSIS — M47.816 SPONDYLOSIS W/OUT MYELOPATHY OR RADICULOPATHY, LUMBAR REGION: ICD-10-CM

## 2024-06-25 PROCEDURE — 99213 OFFICE O/P EST LOW 20 MIN: CPT

## 2024-06-25 RX ORDER — METHYLPREDNISOLONE 4 MG/1
4 TABLET ORAL
Qty: 1 | Refills: 0 | Status: ACTIVE | COMMUNITY
Start: 2017-08-19 | End: 1900-01-01

## 2024-09-12 ENCOUNTER — OFFICE (OUTPATIENT)
Dept: URBAN - METROPOLITAN AREA CLINIC 104 | Facility: CLINIC | Age: 79
Setting detail: OPHTHALMOLOGY
End: 2024-09-12
Payer: MEDICARE

## 2024-09-12 DIAGNOSIS — H01.005: ICD-10-CM

## 2024-09-12 DIAGNOSIS — H25.13: ICD-10-CM

## 2024-09-12 DIAGNOSIS — H01.002: ICD-10-CM

## 2024-09-12 DIAGNOSIS — H01.001: ICD-10-CM

## 2024-09-12 DIAGNOSIS — H01.004: ICD-10-CM

## 2024-09-12 DIAGNOSIS — H43.813: ICD-10-CM

## 2024-09-12 PROCEDURE — 92250 FUNDUS PHOTOGRAPHY W/I&R: CPT | Performed by: OPTOMETRIST

## 2024-09-12 PROCEDURE — 92014 COMPRE OPH EXAM EST PT 1/>: CPT | Performed by: OPTOMETRIST

## 2024-09-12 ASSESSMENT — LID EXAM ASSESSMENTS
OD_BLEPHARITIS: RLL RUL 1+ 2+
OS_BLEPHARITIS: LLL LUL 1+ 2+

## 2024-09-12 ASSESSMENT — CONFRONTATIONAL VISUAL FIELD TEST (CVF)
OD_FINDINGS: FULL
OS_FINDINGS: FULL

## 2024-09-19 ENCOUNTER — OFFICE (OUTPATIENT)
Dept: URBAN - METROPOLITAN AREA CLINIC 104 | Facility: CLINIC | Age: 79
Setting detail: OPHTHALMOLOGY
End: 2024-09-19
Payer: MEDICARE

## 2024-09-19 DIAGNOSIS — H25.13: ICD-10-CM

## 2024-09-19 PROCEDURE — 92015 DETERMINE REFRACTIVE STATE: CPT | Performed by: OPTOMETRIST

## 2024-09-19 ASSESSMENT — CONFRONTATIONAL VISUAL FIELD TEST (CVF)
OD_FINDINGS: FULL
OS_FINDINGS: FULL

## 2024-09-19 ASSESSMENT — LID EXAM ASSESSMENTS
OD_BLEPHARITIS: RLL RUL 1+ 2+
OS_BLEPHARITIS: LLL LUL 1+ 2+

## 2025-02-21 NOTE — PATIENT PROFILE ADULT. - AS SC BRADEN NUTRITION
Critical Care Progress Note    Date of consult: 2/19/2025    Referring Physician  Joe Meehan M.D.    Reason for Consultation  Afib with RVR    History of Presenting Illness  66 y.o. 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 That admission received IV systemic steroids and decompressive laminectomy L3-L4, and instrumented stabilization of C7-T5. Discharged on oral opiate and steroid regimen to SNF. He presented 2/19/2025 with altered mental status at his SNF. Found to be in Afib RVR in the ED.    In the ED today afebrile 36.2, HR 180s-190s Afib RVR, BP 110s/60s, RR 20s, SpO2 94% on RA. Labs showed WBC 13.8 81% PMN with L-shift(up from 12.5 2/13/25), Hb 12.0, Plt 191, BMP wnl, lactate 2.5, troponin 28, INR 1.2, UA with 0-2WBC, trace LE and trace ketones and spec grav 1.023. CXR showing multiple bilateral nodules c/w metastatic disease. Recent CT CHEST/Ab/Pel 2/11/25 with innumerable pulmonary nodules, retroperitoneal LAD, common bile duct dilation, hepatic contour suggesting cirrhosis, retroperitoneal varices suggesting portal hypertension, R nephrolithiasis, L inguinal hernia, coronary artery disease. Recent TTE 2/14/25 with normalization of LV systolic function 59%, normal RV systolic function. Has anterolateral WMA, indeterminate diastolic function. Trace TR with eRVSP 50mmHg.    In the ED, received metop tartrate 5mg twice, diltiazem 10mg, started on dilt drip, 2L IVF. Upon arrival to the ICU was altered and making nonsensical statements and hyperemotional. I spoke with friend Keon Tapia. Also spoke with legal wife Aurelio Green ([phone]) who elected for Maximino to be DNR/DNI    2/19 - admission with  Afib RVR rates 180s-190s  2/20 - rate controlled on dilt drip and fluid resus, dilt drip turned off, family elects hospice for metastatic carcinoma, requiring precedex    Reviewed last 24 hour events:  CV: NSR 50s, Bps good  Pulm: room air  Neuro: Precedex 1.5 and requiring restraints, zyprexa 2.5 daily, ativan PRN  GI: NPO, will discuss comfort feeds  Renal: Saunders in for 24h  ID/Abx: off abx  Endo: BS ok  Labs/imaging: reviewed  Lines/Tubes/Drains: PIVs, saunders    Code Status  DNAR/DNI    Review of Systems  Review of Systems   Reason unable to perform ROS: altered mental status.       Medications  Home Medications       Reviewed by Aniya Pagan (Pharmacy Tech) on 02/19/25 at 1243  Med List Status: Complete     Medication Last Dose Status   acetaminophen (TYLENOL) 325 MG Tab 2/15/2025 Active   acetaminophen (TYLENOL) 500 MG Tab Not Taking Active   lidocaine (ASPERFLEX) 4 % Patch 2/17/2025 Active   lisinopril (PRINIVIL) 5 MG Tab 2/18/2025 Active   methylPREDNISolone (MEDROL DOSEPAK) 4 MG Tablet Therapy Pack 2/19/2025 Active   metoprolol SR (TOPROL XL) 50 MG TABLET SR 24 HR 2/18/2025 Active   oxyCODONE immediate release (ROXICODONE) 10 MG immediate release tablet 2/16/2025 Active   polyethylene glycol/lytes (MIRALAX) Pack Not Taking Active   rosuvastatin (CRESTOR) 10 MG Tab 2/18/2025 Active                  Audit from Redirected Encounters    **Home medications have not yet been reviewed for this encounter**       Current Facility-Administered Medications   Medication Dose Route Frequency Provider Last Rate Last Admin    LORazepam (Ativan) tablet 0.5 mg  0.5 mg Oral Q4HRS PRN Joe Meehan M.D.        dexmedetomidine (Precedex) 400 mcg/100mL infusion  0.1-1.5 mcg/kg/hr (Ideal) Intravenous Continuous Zoie Ewing M.D. 29.1 mL/hr at 02/21/25 0508 1.5 mcg/kg/hr at 02/21/25 0508    Metoprolol Tartrate (Lopressor) injection 2.5 mg  2.5 mg Intravenous Q6HRS Joe Meehan M.D.        OLANZapine juan miguelis  (ZyPREXA Zydis) disintegrating tablet 2.5 mg  2.5 mg Sublingual DAILY Joe Meehan M.D.   2.5 mg at 02/21/25 0504    enoxaparin (Lovenox) inj 40 mg  40 mg Subcutaneous DAILY AT 1800 Joe Meehan M.D.   40 mg at 02/20/25 1721    senna-docusate (Pericolace Or Senokot S) 8.6-50 MG per tablet 2 Tablet  2 Tablet Oral Q EVENING Joe Meehan M.D.        And    polyethylene glycol/lytes (Miralax) Packet 1 Packet  1 Packet Oral QDAY PRN Joe Meehan M.D.        insulin lispro (HumaLOG,AdmeLOG) subcutaneous injection  2-9 Units Subcutaneous 4X/DAY ACHS Joe Meehan M.D.        And    dextrose 50% (D50W) injection 25 g  25 g Intravenous Q15 MIN PRN Joe Meehan M.D.        Pharmacy Consult Request ...Pain Management Review 1 Each  1 Each Other PHARMACY TO DOSE Joe Meehan M.D.        oxyCODONE immediate-release (Roxicodone) tablet 2.5 mg  2.5 mg Oral Q3HRS PRN Joe Meehan M.D.        Or    oxyCODONE immediate-release (Roxicodone) tablet 5 mg  5 mg Oral Q3HRS PRN Joe Meehan M.D.        Or    HYDROmorphone (Dilaudid) injection 0.25 mg  0.25 mg Intravenous Q3HRS PRN Joe Meehan M.D.        acetaminophen (Tylenol) tablet 650 mg  650 mg Oral Q6HRS Joe Meehan M.D.   650 mg at 02/21/25 0512    Followed by    [START ON 2/24/2025] acetaminophen (Tylenol) tablet 1,000 mg  1,000 mg Oral Q6HRS PRN Joe Meehan M.D.           Allergies  No Known Allergies    Vital Signs last 24 hours  Temp:  [35.9 °C (96.6 °F)-36.3 °C (97.4 °F)] 36.3 °C (97.3 °F)  Pulse:  [] 53  Resp:  [12-93] 21  BP: ()/(46-74) 169/73  SpO2:  [88 %-95 %] 94 %    Physical Exam  Exam unchanged from 2/20  Physical Exam  Constitutional:       General: He is not in acute distress.     Appearance: He is not ill-appearing.      Comments: Encephalopathic, altered mental status   HENT:      Head: Normocephalic and atraumatic.      Comments: Midline incision along C/T/L spine is c/d/I       Mouth/Throat:      Mouth: Mucous membranes are  dry.   Eyes:      Extraocular Movements: Extraocular movements intact.      Pupils: Pupils are equal, round, and reactive to light.   Neck:      Comments: ROM limited due to recent surgery  Cardiovascular:      Rate and Rhythm: Normal rate and regular rhythm.      Pulses: Normal pulses.   Pulmonary:      Effort: Pulmonary effort is normal.      Breath sounds: Normal breath sounds.   Abdominal:      Palpations: Abdomen is soft.      Comments: +abdominal obesity   Musculoskeletal:      Cervical back: No rigidity or tenderness.      Right lower leg: No edema.      Left lower leg: No edema.   Neurological:      Comments: LLE 2/5 motor  RLE 4/5 motor   Psychiatric:      Comments: Encephalopathic and confabulating         Fluids    Intake/Output Summary (Last 24 hours) at 2/21/2025 0716  Last data filed at 2/21/2025 0600  Gross per 24 hour   Intake 636.88 ml   Output 930 ml   Net -293.12 ml       Laboratory  Recent Results (from the past 48 hours)   CBC WITH DIFFERENTIAL    Collection Time: 02/19/25 12:13 PM   Result Value Ref Range    WBC 13.8 (H) 4.8 - 10.8 K/uL    RBC 4.36 (L) 4.70 - 6.10 M/uL    Hemoglobin 12.0 (L) 14.0 - 18.0 g/dL    Hematocrit 36.3 (L) 42.0 - 52.0 %    MCV 83.3 81.4 - 97.8 fL    MCH 27.5 27.0 - 33.0 pg    MCHC 33.1 32.3 - 36.5 g/dL    RDW 39.7 35.9 - 50.0 fL    Platelet Count 191 164 - 446 K/uL    MPV 12.8 9.0 - 12.9 fL    Neutrophils-Polys 80.90 (H) 44.00 - 72.00 %    Lymphocytes 5.30 (L) 22.00 - 41.00 %    Monocytes 12.10 0.00 - 13.40 %    Eosinophils 0.40 0.00 - 6.90 %    Basophils 0.10 0.00 - 1.80 %    Immature Granulocytes 1.20 (H) 0.00 - 0.90 %    Nucleated RBC 0.00 0.00 - 0.20 /100 WBC    Neutrophils (Absolute) 11.19 (H) 1.82 - 7.42 K/uL    Lymphs (Absolute) 0.73 (L) 1.00 - 4.80 K/uL    Monos (Absolute) 1.68 (H) 0.00 - 0.85 K/uL    Eos (Absolute) 0.05 0.00 - 0.51 K/uL    Baso (Absolute) 0.02 0.00 - 0.12 K/uL    Immature Granulocytes (abs) 0.17 (H) 0.00 - 0.11 K/uL    NRBC (Absolute) 0.00 K/uL    Basic Metabolic Panel    Collection Time: 02/19/25 12:13 PM   Result Value Ref Range    Sodium 142 135 - 145 mmol/L    Potassium 4.0 3.6 - 5.5 mmol/L    Chloride 109 96 - 112 mmol/L    Co2 20 20 - 33 mmol/L    Glucose 121 (H) 65 - 99 mg/dL    Bun 29 (H) 8 - 22 mg/dL    Creatinine 0.90 0.50 - 1.40 mg/dL    Calcium 8.8 8.4 - 10.2 mg/dL    Anion Gap 13.0 7.0 - 16.0   TROPONIN    Collection Time: 02/19/25 12:13 PM   Result Value Ref Range    Troponin T 28 (H) 6 - 19 ng/L   PT/INR    Collection Time: 02/19/25 12:13 PM   Result Value Ref Range    PT 15.5 (H) 12.0 - 14.6 sec    INR 1.20 (H) 0.87 - 1.13   LACTIC ACID    Collection Time: 02/19/25 12:13 PM   Result Value Ref Range    Lactic Acid 2.5 (H) 0.5 - 2.0 mmol/L   ESTIMATED GFR    Collection Time: 02/19/25 12:13 PM   Result Value Ref Range    GFR (CKD-EPI) 95 >60 mL/min/1.73 m 2   Magnesium    Collection Time: 02/19/25 12:13 PM   Result Value Ref Range    Magnesium 2.1 1.5 - 2.5 mg/dL   PHOSPHORUS    Collection Time: 02/19/25 12:13 PM   Result Value Ref Range    Phosphorus 2.3 (L) 2.5 - 4.5 mg/dL   TSH WITH REFLEX TO FT4    Collection Time: 02/19/25 12:13 PM   Result Value Ref Range    TSH 5.280 0.380 - 5.330 uIU/mL   HIV AG/AB COMBO ASSAY SCREENING    Collection Time: 02/19/25 12:13 PM   Result Value Ref Range    HIV Ag/Ab Combo Assay Non-Reactive Non Reactive   Blood Culture - Draw one from central line and one from peripheral site    Collection Time: 02/19/25 12:25 PM    Specimen: Peripheral; Blood   Result Value Ref Range    Significant Indicator NEG     Source BLD     Site PERIPHERAL     Culture Result       No Growth  Note: Blood cultures are incubated for 5 days and  are monitored continuously.Positive blood cultures  are called to the RN and reported as soon as  they are identified.     EKG (NOW)    Collection Time: 02/19/25 12:27 PM   Result Value Ref Range    Report       [facility]    Test Date:  2025-02-19  Pt  Name:    VALENTIN YADAV                 Department: [facility]  MRN:        [id number]                      [facility]  Gender:     Male                         Technician: [id number]  :        1958                   Requested By:ER TRIAGE PROTOCOL  Order #:    [id number]                    Reading MD: PAULETTE TRIMBLE DO    Measurements  Intervals                                Axis  Rate:       182                          P:          155  GA:         112                          QRS:        23  QRSD:       103                          T:          133  QT:         289  QTc:        503    Interpretive Statements  AF with RVR  Ventricular premature complex  Aberrant conduction of SV complex(es)  Repolarization abnormality, prob rate related  Compared to ECG 2025 05:09:20  Ventricular premature complex(es) now present  Aberrant conduction of supraventricular beat(s) now present  Early repolarization  now present  Sinus rhythm no longer present  Myocardial infarct finding no longer present  Prolonged QT interval no longer present  Electronically Signed On 2025 12:27:18 PST by PAULETTE TRIMBLE DO     Blood Culture - Draw one from central line and one from peripheral site    Collection Time: 25 12:50 PM    Specimen: Line; Blood   Result Value Ref Range    Significant Indicator NEG     Source BLD     Site PERIPHERAL     Culture Result       No Growth  Note: Blood cultures are incubated for 5 days and  are monitored continuously.Positive blood cultures  are called to the RN and reported as soon as  they are identified.     Urinalysis    Collection Time: 25  1:25 PM    Specimen: Urine   Result Value Ref Range    Color Dark Yellow     Character Clear     Specific Gravity 1.023 <1.035    Ph 6.0 5.0 - 8.0    Glucose Negative Negative mg/dL    Ketones Trace (A) Negative mg/dL    Protein Negative Negative mg/dL    Bilirubin Negative Negative    Urobilinogen, Urine 1.0 <=1.0 EU/dL    Nitrite Negative  Negative    Leukocyte Esterase Trace (A) Negative    Occult Blood Negative Negative    Micro Urine Req Microscopic    Urine Culture (New)    Collection Time: 02/19/25  1:25 PM    Specimen: Urine   Result Value Ref Range    Significant Indicator NEG     Source UR     Site -     Culture Result No growth at 24 hours.    URINE MICROSCOPIC (W/UA)    Collection Time: 02/19/25  1:25 PM   Result Value Ref Range    WBC 0-2 /hpf    RBC 0-2 /hpf    Bacteria None Seen None /hpf    Epithelial Cells 0-2 0 - 5 /hpf    Urine Casts 0-2 0 - 2 /lpf   Lactic Acid    Collection Time: 02/19/25  2:49 PM   Result Value Ref Range    Lactic Acid 2.0 0.5 - 2.0 mmol/L   POCT glucose device results    Collection Time: 02/19/25  6:44 PM   Result Value Ref Range    POC Glucose, Blood 111 (H) 65 - 99 mg/dL   MRSA By PCR (Amp)    Collection Time: 02/19/25  7:38 PM    Specimen: Nares; Respirate   Result Value Ref Range    MRSA by PCR Negative Negative   POCT glucose device results    Collection Time: 02/19/25  8:50 PM   Result Value Ref Range    POC Glucose, Blood 109 (H) 65 - 99 mg/dL   CBC without Differential    Collection Time: 02/20/25  3:07 AM   Result Value Ref Range    WBC 11.0 (H) 4.8 - 10.8 K/uL    RBC 3.69 (L) 4.70 - 6.10 M/uL    Hemoglobin 10.2 (L) 14.0 - 18.0 g/dL    Hematocrit 31.7 (L) 42.0 - 52.0 %    MCV 85.9 81.4 - 97.8 fL    MCH 27.6 27.0 - 33.0 pg    MCHC 32.2 (L) 32.3 - 36.5 g/dL    RDW 41.7 35.9 - 50.0 fL    Platelet Count 107 (L) 164 - 446 K/uL    MPV 13.7 (H) 9.0 - 12.9 fL   Comp Metabolic Panel    Collection Time: 02/20/25  3:07 AM   Result Value Ref Range    Sodium 141 135 - 145 mmol/L    Potassium 3.9 3.6 - 5.5 mmol/L    Chloride 113 (H) 96 - 112 mmol/L    Co2 16 (L) 20 - 33 mmol/L    Anion Gap 12.0 7.0 - 16.0    Glucose 97 65 - 99 mg/dL    Bun 24 (H) 8 - 22 mg/dL    Creatinine 0.80 0.50 - 1.40 mg/dL    Calcium 7.9 (L) 8.4 - 10.2 mg/dL    Correct Calcium 9.4 8.5 - 10.5 mg/dL    AST(SGOT) 37 12 - 45 U/L    ALT(SGPT) 19 2 -  50 U/L    Alkaline Phosphatase 74 30 - 99 U/L    Total Bilirubin 1.2 0.1 - 1.5 mg/dL    Albumin 2.1 (L) 3.2 - 4.9 g/dL    Total Protein 4.8 (L) 6.0 - 8.2 g/dL    Globulin 2.7 1.9 - 3.5 g/dL    A-G Ratio 0.8 g/dL   ESTIMATED GFR    Collection Time: 25  3:07 AM   Result Value Ref Range    GFR (CKD-EPI) 98 >60 mL/min/1.73 m 2   VITAMIN B12    Collection Time: 25  3:07 AM   Result Value Ref Range    Vitamin B12 -True Cobalamin 1544 (H) 211 - 911 pg/mL   POCT glucose device results    Collection Time: 25  7:29 AM   Result Value Ref Range    POC Glucose, Blood 120 (H) 65 - 99 mg/dL   AMMONIA    Collection Time: 25 10:30 AM   Result Value Ref Range    Ammonia 33 11 - 45 umol/L   POCT glucose device results    Collection Time: 25 11:50 AM   Result Value Ref Range    POC Glucose, Blood 142 (H) 65 - 99 mg/dL   EKG    Collection Time: 25  4:58 PM   Result Value Ref Range    Report       [facility]    Test Date:  2025  Pt Name:    VALENTIN YADAV                 Department: ICU  MRN:        [id number]                      Room:       [facility]  Gender:     Male                         Technician: [id number]  :        1958                   Requested By:ELMER MICHEL  Order #:    [id number]                    Reading MD: Pao Loyd MD    Measurements  Intervals                                Axis  Rate:       59                           P:          54  AR:         144                          QRS:        42  QRSD:       108                          T:          57  QT:         490  QTc:        486    Interpretive Statements  Sinus bradycardia  Electronically Signed On 2025 16:58:33 PST by Pao Loyd MD     POCT glucose device results    Collection Time: 25  5:18 PM   Result Value Ref Range    POC Glucose, Blood 125 (H) 65 - 99 mg/dL   POCT glucose device results    Collection Time: 25  8:19 PM   Result Value Ref Range    POC Glucose, Blood 134 (H) 65 - 99 mg/dL   POCT  glucose device results    Collection Time: 02/21/25  6:25 AM   Result Value Ref Range    POC Glucose, Blood 136 (H) 65 - 99 mg/dL       Imaging  CXR with multiple pulm mets    Assessment/Plan  * Atrial fibrillation with rapid ventricular response (HCC)- (present on admission)  Assessment & Plan  Patient with chronic A-fib that is not anticoagulated at baseline.  Presented with rates in the 180s to 190s.  Differential diagnosis of etiology of RVR was likely hypovolemia given very dehydrated exam on presentation and improvement of Afib with fluids. Infection considered but UA normal, CXR without PNA, recent surgical site is c/d/I.  Replete K, Mg, Phos  Considered obtaining MRI C/T/L spine w and w/o but would require intubation and per wife this would be against goals of care.  Continue IV metop tartrate 2.5 q6h  Wean precedex  Will not anticoagulate for now given recent spinal instrumentation  Patient with normal LV and RV function within 1 week ago on TTE. Hyperdynamic LVEF and no pericardial effusion on POCUS on admit  Discuss comfort care with family today    Other cirrhosis of liver (HCC)- (present on admission)  Assessment & Plan  Patient has retroperitoneal varices on imaging implying portal hypertension.  No evidence of bleeding today.  Nothing to do.  Ammonia, b12, HIV wnl    Spinal cord compression (HCC)- (present on admission)  Assessment & Plan  As above    Type 2 diabetes mellitus with hyperglycemia, with long-term current use of insulin (HCC)- (present on admission)  Assessment & Plan  ACHS accuchecks and SSI    History of cancer tonsil- (present on admission)  Assessment & Plan  Reportedly with stage II tonsillar cancer in 2022.  However during recent hospitalization found to have multiple bilateral pulmonary nodules consistent with metastatic process as well as T3 and T4 metastasis.  Surgical pathology confirms carcinoma from T2-T4 stabilization surgery.  Patient's wife was unaware of the recurrence of  cancer but was updated at length 2/20. Given Maximino's previous challenges with chemotherapy she elects to pursue hospice care.  - palliative consult for hospice      Acute encephalopathy  Ammonia, B12, HIV wnl  Possibly due to intracranial metastases but patient cannot lie still for MRI with intubation and family has elected hospice care. Will discuss full transition to comfort care with them today.    Discussed patient condition and risk of morbidity and/or mortality with RN, RT, family, patient.      Joe Meehan MD  Pulmonary and Critical Care Medicine  [facility]           (3) adequate

## 2025-03-20 ENCOUNTER — OFFICE (OUTPATIENT)
Dept: URBAN - METROPOLITAN AREA CLINIC 104 | Facility: CLINIC | Age: 80
Setting detail: OPHTHALMOLOGY
End: 2025-03-20
Payer: MEDICARE

## 2025-03-20 DIAGNOSIS — H01.004: ICD-10-CM

## 2025-03-20 DIAGNOSIS — H25.13: ICD-10-CM

## 2025-03-20 DIAGNOSIS — H43.813: ICD-10-CM

## 2025-03-20 DIAGNOSIS — H01.002: ICD-10-CM

## 2025-03-20 DIAGNOSIS — H52.4: ICD-10-CM

## 2025-03-20 DIAGNOSIS — H01.005: ICD-10-CM

## 2025-03-20 DIAGNOSIS — H01.001: ICD-10-CM

## 2025-03-20 PROCEDURE — 92015 DETERMINE REFRACTIVE STATE: CPT | Performed by: OPTOMETRIST

## 2025-03-20 PROCEDURE — 92250 FUNDUS PHOTOGRAPHY W/I&R: CPT | Performed by: OPTOMETRIST

## 2025-03-20 PROCEDURE — 92014 COMPRE OPH EXAM EST PT 1/>: CPT | Performed by: OPTOMETRIST

## 2025-03-20 ASSESSMENT — REFRACTION_CURRENTRX
OS_SPHERE: -2.00
OS_ADD: +2.25
OD_AXIS: 106
OD_OVR_VA: 20/
OS_AXIS: 054
OD_ADD: +2.25
OS_CYLINDER: -1.00
OD_CYLINDER: -0.75
OD_SPHERE: -0.75
OS_OVR_VA: 20/

## 2025-03-20 ASSESSMENT — REFRACTION_AUTOREFRACTION
OD_SPHERE: -1.25
OS_AXIS: 048
OS_SPHERE: -3.00
OD_AXIS: 148
OS_CYLINDER: -0.50
OD_CYLINDER: -0.25

## 2025-03-20 ASSESSMENT — CONFRONTATIONAL VISUAL FIELD TEST (CVF)
OD_FINDINGS: FULL
OS_FINDINGS: FULL

## 2025-03-20 ASSESSMENT — LID EXAM ASSESSMENTS
OD_BLEPHARITIS: RLL RUL 1+ 2+
OS_BLEPHARITIS: LLL LUL 1+ 2+

## 2025-03-20 ASSESSMENT — REFRACTION_MANIFEST
OS_AXIS: 055
OD_ADD: +2.50
OS_ADD: +2.50
OD_SPHERE: -1.25
OS_VA1: 20/30-
OD_VA1: 20/30-
OD_AXIS: 105
OS_SPHERE: -2.50
OS_CYLINDER: -0.50
OD_CYLINDER: -0.50

## 2025-03-20 ASSESSMENT — VISUAL ACUITY
OD_BCVA: 20/40
OS_BCVA: 20/40

## 2025-03-20 ASSESSMENT — KERATOMETRY
OD_AXISANGLE_DEGREES: 092
OD_K2POWER_DIOPTERS: 44.29
OS_AXISANGLE_DEGREES: 095
OS_K2POWER_DIOPTERS: 44.35
OD_K1POWER_DIOPTERS: 43.21
OS_K1POWER_DIOPTERS: 43.32

## 2025-03-20 ASSESSMENT — TONOMETRY
OS_IOP_MMHG: 18
OD_IOP_MMHG: 18

## 2025-05-15 ENCOUNTER — NON-APPOINTMENT (OUTPATIENT)
Age: 80
End: 2025-05-15

## 2025-05-15 ENCOUNTER — APPOINTMENT (OUTPATIENT)
Dept: NEUROLOGY | Facility: CLINIC | Age: 80
End: 2025-05-15
Payer: MEDICARE

## 2025-05-15 VITALS
HEIGHT: 63 IN | SYSTOLIC BLOOD PRESSURE: 108 MMHG | HEART RATE: 77 BPM | BODY MASS INDEX: 33.13 KG/M2 | DIASTOLIC BLOOD PRESSURE: 72 MMHG | WEIGHT: 187 LBS

## 2025-05-15 DIAGNOSIS — Z78.9 OTHER SPECIFIED HEALTH STATUS: ICD-10-CM

## 2025-05-15 DIAGNOSIS — R68.89 OTHER GENERAL SYMPTOMS AND SIGNS: ICD-10-CM

## 2025-05-15 DIAGNOSIS — R90.82 WHITE MATTER DISEASE, UNSPECIFIED: ICD-10-CM

## 2025-05-15 PROCEDURE — 99203 OFFICE O/P NEW LOW 30 MIN: CPT
